# Patient Record
Sex: FEMALE | Race: WHITE
[De-identification: names, ages, dates, MRNs, and addresses within clinical notes are randomized per-mention and may not be internally consistent; named-entity substitution may affect disease eponyms.]

---

## 2017-07-18 ENCOUNTER — APPOINTMENT (OUTPATIENT)
Dept: CARDIOLOGY | Facility: CLINIC | Age: 67
End: 2017-07-18

## 2017-07-31 ENCOUNTER — RX RENEWAL (OUTPATIENT)
Age: 67
End: 2017-07-31

## 2017-08-30 ENCOUNTER — RECORD ABSTRACTING (OUTPATIENT)
Age: 67
End: 2017-08-30

## 2017-08-30 ENCOUNTER — NON-APPOINTMENT (OUTPATIENT)
Age: 67
End: 2017-08-30

## 2017-08-30 ENCOUNTER — APPOINTMENT (OUTPATIENT)
Dept: CARDIOLOGY | Facility: CLINIC | Age: 67
End: 2017-08-30
Payer: MEDICARE

## 2017-08-30 VITALS
WEIGHT: 127 LBS | HEART RATE: 69 BPM | BODY MASS INDEX: 24.94 KG/M2 | OXYGEN SATURATION: 98 % | TEMPERATURE: 98.1 F | HEIGHT: 60 IN

## 2017-08-30 VITALS — RESPIRATION RATE: 14 BRPM | SYSTOLIC BLOOD PRESSURE: 126 MMHG | DIASTOLIC BLOOD PRESSURE: 78 MMHG

## 2017-08-30 PROCEDURE — 93000 ELECTROCARDIOGRAM COMPLETE: CPT

## 2017-08-30 PROCEDURE — 99215 OFFICE O/P EST HI 40 MIN: CPT | Mod: 25

## 2017-10-18 ENCOUNTER — APPOINTMENT (OUTPATIENT)
Dept: CARDIOLOGY | Facility: CLINIC | Age: 67
End: 2017-10-18
Payer: MEDICARE

## 2017-10-18 PROCEDURE — 93306 TTE W/DOPPLER COMPLETE: CPT

## 2018-10-18 ENCOUNTER — MEDICATION RENEWAL (OUTPATIENT)
Age: 68
End: 2018-10-18

## 2018-10-22 ENCOUNTER — MEDICATION RENEWAL (OUTPATIENT)
Age: 68
End: 2018-10-22

## 2018-11-02 ENCOUNTER — NON-APPOINTMENT (OUTPATIENT)
Age: 68
End: 2018-11-02

## 2018-11-02 ENCOUNTER — APPOINTMENT (OUTPATIENT)
Dept: CARDIOLOGY | Facility: CLINIC | Age: 68
End: 2018-11-02
Payer: MEDICARE

## 2018-11-02 VITALS — HEIGHT: 60 IN | BODY MASS INDEX: 24.54 KG/M2 | WEIGHT: 125 LBS

## 2018-11-02 VITALS — SYSTOLIC BLOOD PRESSURE: 150 MMHG | DIASTOLIC BLOOD PRESSURE: 72 MMHG | HEART RATE: 102 BPM | OXYGEN SATURATION: 99 %

## 2018-11-02 VITALS — DIASTOLIC BLOOD PRESSURE: 80 MMHG | SYSTOLIC BLOOD PRESSURE: 114 MMHG

## 2018-11-02 PROCEDURE — 93000 ELECTROCARDIOGRAM COMPLETE: CPT

## 2018-11-02 PROCEDURE — 99214 OFFICE O/P EST MOD 30 MIN: CPT | Mod: 25

## 2018-11-02 RX ORDER — OMALIZUMAB 202.5 MG/1.4ML
150 INJECTION, SOLUTION SUBCUTANEOUS
Refills: 0 | Status: ACTIVE | COMMUNITY

## 2018-12-03 ENCOUNTER — MEDICATION RENEWAL (OUTPATIENT)
Age: 68
End: 2018-12-03

## 2018-12-05 ENCOUNTER — APPOINTMENT (OUTPATIENT)
Dept: CARDIOLOGY | Facility: CLINIC | Age: 68
End: 2018-12-05

## 2018-12-21 ENCOUNTER — APPOINTMENT (OUTPATIENT)
Dept: OPHTHALMOLOGY | Facility: CLINIC | Age: 68
End: 2018-12-21

## 2018-12-26 ENCOUNTER — MEDICATION RENEWAL (OUTPATIENT)
Age: 68
End: 2018-12-26

## 2018-12-26 ENCOUNTER — RX RENEWAL (OUTPATIENT)
Age: 68
End: 2018-12-26

## 2019-01-02 ENCOUNTER — APPOINTMENT (OUTPATIENT)
Dept: OPHTHALMOLOGY | Facility: CLINIC | Age: 69
End: 2019-01-02
Payer: MEDICARE

## 2019-01-02 DIAGNOSIS — H04.122 DRY EYE SYNDROME OF LEFT LACRIMAL GLAND: ICD-10-CM

## 2019-01-02 DIAGNOSIS — H26.9 UNSPECIFIED CATARACT: ICD-10-CM

## 2019-01-02 PROCEDURE — 92002 INTRM OPH EXAM NEW PATIENT: CPT

## 2019-04-03 ENCOUNTER — CLINICAL ADVICE (OUTPATIENT)
Age: 69
End: 2019-04-03

## 2019-04-03 ENCOUNTER — RX RENEWAL (OUTPATIENT)
Age: 69
End: 2019-04-03

## 2019-05-01 ENCOUNTER — RX RENEWAL (OUTPATIENT)
Age: 69
End: 2019-05-01

## 2019-06-10 ENCOUNTER — MEDICATION RENEWAL (OUTPATIENT)
Age: 69
End: 2019-06-10

## 2019-06-18 ENCOUNTER — NON-APPOINTMENT (OUTPATIENT)
Age: 69
End: 2019-06-18

## 2019-06-18 ENCOUNTER — APPOINTMENT (OUTPATIENT)
Dept: CARDIOLOGY | Facility: CLINIC | Age: 69
End: 2019-06-18
Payer: MEDICARE

## 2019-06-18 VITALS
OXYGEN SATURATION: 97 % | HEIGHT: 60 IN | WEIGHT: 125 LBS | HEART RATE: 66 BPM | SYSTOLIC BLOOD PRESSURE: 128 MMHG | DIASTOLIC BLOOD PRESSURE: 75 MMHG | BODY MASS INDEX: 24.54 KG/M2

## 2019-06-18 PROCEDURE — 93000 ELECTROCARDIOGRAM COMPLETE: CPT

## 2019-06-18 PROCEDURE — 99214 OFFICE O/P EST MOD 30 MIN: CPT | Mod: 25

## 2019-06-18 RX ORDER — CETIRIZINE HCL 10 MG
10 TABLET ORAL
Refills: 0 | Status: ACTIVE | COMMUNITY

## 2019-06-18 NOTE — REVIEW OF SYSTEMS
[Joint Pain] : joint pain [Shortness Of Breath] : no shortness of breath [Dyspnea on exertion] : not dyspnea during exertion [Chest  Pressure] : no chest pressure [Chest Pain] : no chest pain [Lower Ext Edema] : no extremity edema [Palpitations] : no palpitations [Wheezing] : no wheezing [Under Stress] : under stress

## 2019-06-18 NOTE — PHYSICAL EXAM
[Normal Appearance] : normal appearance [Well Groomed] : well groomed [General Appearance - In No Acute Distress] : no acute distress [Eyelids - No Xanthelasma] : the eyelids demonstrated no xanthelasmas [No Oral Cyanosis] : no oral cyanosis [] : no respiratory distress [Respiration, Rhythm And Depth] : normal respiratory rhythm and effort [Auscultation Breath Sounds / Voice Sounds] : lungs were clear to auscultation bilaterally [Heart Rate And Rhythm] : heart rate and rhythm were normal [Heart Sounds] : normal S1 and S2 [Murmurs] : no murmurs present [Edema] : no peripheral edema present [Cyanosis, Localized] : no localized cyanosis [Oriented To Time, Place, And Person] : oriented to person, place, and time [Gait - Sufficient For Exercise Testing] : the gait was sufficient for exercise testing

## 2019-06-18 NOTE — HISTORY OF PRESENT ILLNESS
[FreeTextEntry1] : Silvia Curran is a 69 year old woman with a history of asthmatic bronchitis, small pericardial effusion, hypertension, hyperlipidemia, osteoarthritis, anemia, who returns for routine cardiac examination.  She recently contacted me by telephone to describe elevated blood pressure readings (when checked in her allergist's office) -- we subsequently increased her dose of losartan from 25 mg to 50 mg; she has been tolerating therapy and continues to feel well.  She has still not scheduled echocardiography and exercise stress testing (ordered many months ago).  She denies angina, dyspnea, palpitations.

## 2019-06-18 NOTE — DISCUSSION/SUMMARY
[___ Month(s)] : [unfilled] month(s) [With Me] : with me [FreeTextEntry1] : \par Chest pain: Previous complaints of chest pain anya not recurred; continue to await her scheduling of exercise ECG stress testing.\par \par Hypertension: Improved / controlled; continue losartan.\par \par Pericardial effusion: Reassess with echocardiography.\par

## 2019-06-24 ENCOUNTER — MEDICATION RENEWAL (OUTPATIENT)
Age: 69
End: 2019-06-24

## 2019-07-08 ENCOUNTER — APPOINTMENT (OUTPATIENT)
Dept: CARDIOLOGY | Facility: CLINIC | Age: 69
End: 2019-07-08
Payer: MEDICARE

## 2019-07-08 PROCEDURE — 93015 CV STRESS TEST SUPVJ I&R: CPT

## 2019-07-10 ENCOUNTER — APPOINTMENT (OUTPATIENT)
Dept: CARDIOLOGY | Facility: CLINIC | Age: 69
End: 2019-07-10

## 2019-10-29 ENCOUNTER — OUTPATIENT (OUTPATIENT)
Dept: OUTPATIENT SERVICES | Facility: HOSPITAL | Age: 69
LOS: 1 days | End: 2019-10-29
Payer: MEDICARE

## 2019-10-29 DIAGNOSIS — M54.12 RADICULOPATHY, CERVICAL REGION: ICD-10-CM

## 2019-10-29 PROCEDURE — 62321 NJX INTERLAMINAR CRV/THRC: CPT

## 2019-10-29 PROCEDURE — 77003 FLUOROGUIDE FOR SPINE INJECT: CPT

## 2019-11-14 ENCOUNTER — RESULT REVIEW (OUTPATIENT)
Age: 69
End: 2019-11-14

## 2019-12-06 ENCOUNTER — APPOINTMENT (OUTPATIENT)
Dept: CARDIOLOGY | Facility: CLINIC | Age: 69
End: 2019-12-06
Payer: MEDICARE

## 2019-12-06 ENCOUNTER — NON-APPOINTMENT (OUTPATIENT)
Age: 69
End: 2019-12-06

## 2019-12-06 VITALS
HEART RATE: 99 BPM | OXYGEN SATURATION: 96 % | HEIGHT: 60 IN | SYSTOLIC BLOOD PRESSURE: 105 MMHG | DIASTOLIC BLOOD PRESSURE: 73 MMHG | BODY MASS INDEX: 23.95 KG/M2 | WEIGHT: 122 LBS

## 2019-12-06 VITALS — SYSTOLIC BLOOD PRESSURE: 108 MMHG | DIASTOLIC BLOOD PRESSURE: 76 MMHG

## 2019-12-06 DIAGNOSIS — E78.5 HYPERLIPIDEMIA, UNSPECIFIED: ICD-10-CM

## 2019-12-06 PROCEDURE — 99214 OFFICE O/P EST MOD 30 MIN: CPT

## 2019-12-06 PROCEDURE — 93000 ELECTROCARDIOGRAM COMPLETE: CPT

## 2019-12-06 NOTE — DISCUSSION/SUMMARY
[___ Month(s)] : [unfilled] month(s) [With Me] : with me [FreeTextEntry1] : \par Coronary atherosclerosis: Asymptomatic; normal recent exercise ECG stress test.  We discussed risk factor modification.\par \par Hypertension: Blood pressure lower than previously measured, possibly related to recent weight loss; I recommend decreasing the dose of losartan to 25 mg daily.\par \par Hyperlipidemia: Patient will have a fasting lipid panel checked by her primary care physician and then contact me to discuss results -- would favor initiation of a statin if LDL remains elevated (for example, in January 2018 her LDL was 147).\par \par Pericardial effusion: There is a long history of small pericardial effusion - she'll return for echocardiography to reassess size.

## 2019-12-06 NOTE — PHYSICAL EXAM
[Normal Appearance] : normal appearance [Well Groomed] : well groomed [General Appearance - In No Acute Distress] : no acute distress [] : no respiratory distress [Respiration, Rhythm And Depth] : normal respiratory rhythm and effort [Heart Rate And Rhythm] : heart rate and rhythm were normal [Auscultation Breath Sounds / Voice Sounds] : lungs were clear to auscultation bilaterally [Murmurs] : no murmurs present [Heart Sounds] : normal S1 and S2 [Oriented To Time, Place, And Person] : oriented to person, place, and time [Abnormal Walk] : normal gait [Impaired Insight] : insight and judgment were intact [Mood] : the mood was normal [Affect] : the affect was normal

## 2019-12-06 NOTE — HISTORY OF PRESENT ILLNESS
[FreeTextEntry1] : Silvia Curran is a 69 year old woman with a history of asthmatic bronchitis, small pericardial effusion, hypertension, hyperlipidemia, osteoarthritis, anemia, who returns for routine cardiac examination.  She was recently diagnosed with a head and neck cancer and PET scan describes the presence of coronary and aortic atherosclerosis and small pericardial effusion.   She continues to feel well from a cardiac standpoint describes "lower than normal" blood pressure on recent measurements made during other doctors visits.  She has not been experiencing lightheadedness, dizziness, syncope, angina, or dyspnea.

## 2019-12-06 NOTE — REVIEW OF SYSTEMS
[Under Stress] : under stress [Joint Pain] : joint pain [Recent Weight Loss (___ Lbs)] : recent [unfilled] ~Ulb weight loss [see HPI] : see HPI [Shortness Of Breath] : no shortness of breath [Chest  Pressure] : no chest pressure [Dyspnea on exertion] : not dyspnea during exertion [Palpitations] : no palpitations [Lower Ext Edema] : no extremity edema [Chest Pain] : no chest pain [Wheezing] : no wheezing

## 2020-01-03 ENCOUNTER — APPOINTMENT (OUTPATIENT)
Dept: SPEECH THERAPY | Facility: CLINIC | Age: 70
End: 2020-01-03
Payer: MEDICARE

## 2020-01-03 PROCEDURE — 92610 EVALUATE SWALLOWING FUNCTION: CPT | Mod: GN

## 2020-01-06 ENCOUNTER — MEDICATION RENEWAL (OUTPATIENT)
Age: 70
End: 2020-01-06

## 2020-01-06 ENCOUNTER — RX RENEWAL (OUTPATIENT)
Age: 70
End: 2020-01-06

## 2020-01-29 ENCOUNTER — APPOINTMENT (OUTPATIENT)
Dept: SPEECH THERAPY | Facility: CLINIC | Age: 70
End: 2020-01-29

## 2020-02-26 ENCOUNTER — APPOINTMENT (OUTPATIENT)
Dept: SPEECH THERAPY | Facility: CLINIC | Age: 70
End: 2020-02-26
Payer: MEDICARE

## 2020-02-26 PROCEDURE — 92526 ORAL FUNCTION THERAPY: CPT | Mod: GN

## 2020-03-16 ENCOUNTER — APPOINTMENT (OUTPATIENT)
Dept: SPEECH THERAPY | Facility: CLINIC | Age: 70
End: 2020-03-16

## 2020-04-27 ENCOUNTER — APPOINTMENT (OUTPATIENT)
Dept: SPEECH THERAPY | Facility: CLINIC | Age: 70
End: 2020-04-27
Payer: MEDICARE

## 2020-04-27 PROCEDURE — 99448 NTRPROF PH1/NTRNET/EHR 21-30: CPT | Mod: GN

## 2020-05-21 ENCOUNTER — APPOINTMENT (OUTPATIENT)
Dept: SPEECH THERAPY | Facility: CLINIC | Age: 70
End: 2020-05-21
Payer: MEDICARE

## 2020-05-21 PROCEDURE — 98967 PH1 ASSMT&MGMT NQHP 11-20: CPT | Mod: CR

## 2020-08-27 ENCOUNTER — APPOINTMENT (OUTPATIENT)
Dept: ORTHOPEDIC SURGERY | Facility: CLINIC | Age: 70
End: 2020-08-27
Payer: MEDICARE

## 2020-08-27 VITALS
BODY MASS INDEX: 20.62 KG/M2 | WEIGHT: 105 LBS | HEIGHT: 60 IN | DIASTOLIC BLOOD PRESSURE: 83 MMHG | SYSTOLIC BLOOD PRESSURE: 144 MMHG | HEART RATE: 92 BPM

## 2020-08-27 PROCEDURE — 99203 OFFICE O/P NEW LOW 30 MIN: CPT

## 2020-08-29 RX ORDER — ALENDRONATE SODIUM 35 MG/1
35 TABLET ORAL
Qty: 12 | Refills: 0 | Status: ACTIVE | COMMUNITY
Start: 2020-04-23

## 2020-08-29 RX ORDER — ALBUTEROL SULFATE 2.5 MG/3ML
(2.5 MG/3ML) SOLUTION RESPIRATORY (INHALATION)
Qty: 375 | Refills: 0 | Status: ACTIVE | COMMUNITY
Start: 2020-02-24

## 2020-11-12 ENCOUNTER — APPOINTMENT (OUTPATIENT)
Dept: ORTHOPEDIC SURGERY | Facility: CLINIC | Age: 70
End: 2020-11-12
Payer: MEDICARE

## 2020-11-12 VITALS
BODY MASS INDEX: 20.62 KG/M2 | HEIGHT: 60 IN | HEART RATE: 102 BPM | DIASTOLIC BLOOD PRESSURE: 77 MMHG | SYSTOLIC BLOOD PRESSURE: 120 MMHG | WEIGHT: 105 LBS

## 2020-11-12 DIAGNOSIS — M19.041 PRIMARY OSTEOARTHRITIS, RIGHT HAND: ICD-10-CM

## 2020-11-12 DIAGNOSIS — M19.042 PRIMARY OSTEOARTHRITIS, LEFT HAND: ICD-10-CM

## 2020-11-12 PROCEDURE — 99214 OFFICE O/P EST MOD 30 MIN: CPT

## 2020-11-18 ENCOUNTER — OUTPATIENT (OUTPATIENT)
Dept: OUTPATIENT SERVICES | Facility: HOSPITAL | Age: 70
LOS: 1 days | End: 2020-11-18
Payer: MEDICARE

## 2020-11-18 VITALS
SYSTOLIC BLOOD PRESSURE: 123 MMHG | DIASTOLIC BLOOD PRESSURE: 80 MMHG | RESPIRATION RATE: 16 BRPM | TEMPERATURE: 98 F | HEART RATE: 86 BPM | HEIGHT: 60 IN | OXYGEN SATURATION: 98 % | WEIGHT: 104.94 LBS

## 2020-11-18 DIAGNOSIS — Z98.890 OTHER SPECIFIED POSTPROCEDURAL STATES: Chronic | ICD-10-CM

## 2020-11-18 DIAGNOSIS — Z98.49 CATARACT EXTRACTION STATUS, UNSPECIFIED EYE: Chronic | ICD-10-CM

## 2020-11-18 DIAGNOSIS — J44.9 CHRONIC OBSTRUCTIVE PULMONARY DISEASE, UNSPECIFIED: ICD-10-CM

## 2020-11-18 DIAGNOSIS — Z87.81 PERSONAL HISTORY OF (HEALED) TRAUMATIC FRACTURE: Chronic | ICD-10-CM

## 2020-11-18 DIAGNOSIS — G56.00 CARPAL TUNNEL SYNDROME, UNSPECIFIED UPPER LIMB: ICD-10-CM

## 2020-11-18 DIAGNOSIS — Z01.818 ENCOUNTER FOR OTHER PREPROCEDURAL EXAMINATION: ICD-10-CM

## 2020-11-18 DIAGNOSIS — G56.02 CARPAL TUNNEL SYNDROME, LEFT UPPER LIMB: ICD-10-CM

## 2020-11-18 LAB
ALBUMIN SERPL ELPH-MCNC: 4.3 G/DL — SIGNIFICANT CHANGE UP (ref 3.3–5)
ALP SERPL-CCNC: 66 U/L — SIGNIFICANT CHANGE UP (ref 40–120)
ALT FLD-CCNC: 15 U/L — SIGNIFICANT CHANGE UP (ref 10–45)
ANION GAP SERPL CALC-SCNC: 11 MMOL/L — SIGNIFICANT CHANGE UP (ref 5–17)
AST SERPL-CCNC: 25 U/L — SIGNIFICANT CHANGE UP (ref 10–40)
BILIRUB SERPL-MCNC: 0.4 MG/DL — SIGNIFICANT CHANGE UP (ref 0.2–1.2)
BUN SERPL-MCNC: 15 MG/DL — SIGNIFICANT CHANGE UP (ref 7–23)
CALCIUM SERPL-MCNC: 10.2 MG/DL — SIGNIFICANT CHANGE UP (ref 8.4–10.5)
CHLORIDE SERPL-SCNC: 102 MMOL/L — SIGNIFICANT CHANGE UP (ref 96–108)
CO2 SERPL-SCNC: 27 MMOL/L — SIGNIFICANT CHANGE UP (ref 22–31)
CREAT SERPL-MCNC: 0.98 MG/DL — SIGNIFICANT CHANGE UP (ref 0.5–1.3)
GLUCOSE SERPL-MCNC: 99 MG/DL — SIGNIFICANT CHANGE UP (ref 70–99)
HCT VFR BLD CALC: 40.1 % — SIGNIFICANT CHANGE UP (ref 34.5–45)
HGB BLD-MCNC: 13.4 G/DL — SIGNIFICANT CHANGE UP (ref 11.5–15.5)
MCHC RBC-ENTMCNC: 28.9 PG — SIGNIFICANT CHANGE UP (ref 27–34)
MCHC RBC-ENTMCNC: 33.4 GM/DL — SIGNIFICANT CHANGE UP (ref 32–36)
MCV RBC AUTO: 86.6 FL — SIGNIFICANT CHANGE UP (ref 80–100)
NRBC # BLD: 0 /100 WBCS — SIGNIFICANT CHANGE UP (ref 0–0)
PLATELET # BLD AUTO: 203 K/UL — SIGNIFICANT CHANGE UP (ref 150–400)
POTASSIUM SERPL-MCNC: 4.2 MMOL/L — SIGNIFICANT CHANGE UP (ref 3.5–5.3)
POTASSIUM SERPL-SCNC: 4.2 MMOL/L — SIGNIFICANT CHANGE UP (ref 3.5–5.3)
PROT SERPL-MCNC: 6.6 G/DL — SIGNIFICANT CHANGE UP (ref 6–8.3)
RBC # BLD: 4.63 M/UL — SIGNIFICANT CHANGE UP (ref 3.8–5.2)
RBC # FLD: 12.4 % — SIGNIFICANT CHANGE UP (ref 10.3–14.5)
SARS-COV-2 RNA SPEC QL NAA+PROBE: SIGNIFICANT CHANGE UP
SODIUM SERPL-SCNC: 140 MMOL/L — SIGNIFICANT CHANGE UP (ref 135–145)
WBC # BLD: 7.28 K/UL — SIGNIFICANT CHANGE UP (ref 3.8–10.5)
WBC # FLD AUTO: 7.28 K/UL — SIGNIFICANT CHANGE UP (ref 3.8–10.5)

## 2020-11-18 PROCEDURE — G0463: CPT

## 2020-11-18 PROCEDURE — U0003: CPT

## 2020-11-18 PROCEDURE — 85027 COMPLETE CBC AUTOMATED: CPT

## 2020-11-18 PROCEDURE — 80053 COMPREHEN METABOLIC PANEL: CPT

## 2020-11-18 RX ORDER — CHLORHEXIDINE GLUCONATE 213 G/1000ML
1 SOLUTION TOPICAL ONCE
Refills: 0 | Status: DISCONTINUED | OUTPATIENT
Start: 2020-11-23 | End: 2020-12-08

## 2020-11-18 RX ORDER — SODIUM CHLORIDE 9 MG/ML
3 INJECTION INTRAMUSCULAR; INTRAVENOUS; SUBCUTANEOUS EVERY 8 HOURS
Refills: 0 | Status: DISCONTINUED | OUTPATIENT
Start: 2020-11-23 | End: 2020-12-08

## 2020-11-18 RX ORDER — LIDOCAINE HCL 20 MG/ML
0.2 VIAL (ML) INJECTION ONCE
Refills: 0 | Status: DISCONTINUED | OUTPATIENT
Start: 2020-11-23 | End: 2020-12-08

## 2020-11-18 NOTE — H&P PST ADULT - HISTORY OF PRESENT ILLNESS
71 yo female with h/o HTN, asthmatic bronchitis, anemia, small pericardial effusion (longstanding), head and neck malignancy (diagnosed 1/2020, s/p 35 radiation treatments and 3 cycles of chemotherapy) and left carpal tunnel syndrome with c/o worsening paresthesias and numbness in the radial aspect of the left hand. Pt is scheduled for Left Wrist Carpal Tunnel Release on 11/23/2020.    Covid-19 PCTR sent from [facility] on 11/18/2020. 71 yo female with h/o HTN, asthmatic bronchitis, anemia, small pericardial effusion (longstanding), head and neck malignancy (diagnosed 1/2020, s/p 35 radiation treatments and 3 cycles of chemotherapy) and left carpal tunnel syndrome with c/o worsening paresthesias and numbness in the radial aspect of the left hand. Pt is scheduled for Left Wrist Carpal Tunnel Release on 11/23/2020.    Covid-19 PCR sent from [facility] on 11/18/2020.

## 2020-11-18 NOTE — H&P PST ADULT - NSICDXPASTMEDICALHX_GEN_ALL_CORE_FT
PAST MEDICAL HISTORY:  Hypertension     Osteoarthritis     Samter's triad      PAST MEDICAL HISTORY:  Asthmatic bronchitis well-controlled, no recent exacerbations    Carpal tunnel syndrome left    Cervicalgia     GERD (gastroesophageal reflux disease)     Head and neck cancer tonsil - diagnosed 1/2020 - s/p 35 radiation treatments and 3 cycles of chemotherapy, followed by Dr Tovar ([facility])    Hypertension     Osteoarthritis     Pericardial effusion longstanding h/o small pericardial effusion - last Echo 2017 revealed trace pericardial effusion    Samter's triad      PAST MEDICAL HISTORY:  Asthmatic bronchitis well-controlled, no recent exacerbations    Carpal tunnel syndrome left    Cervicalgia     COVID-19 Pt had Covid-19 3/2020, did not require hospitalization    GERD (gastroesophageal reflux disease)     Head and neck cancer tonsil - diagnosed 1/2020 - s/p 35 radiation treatments and 3 cycles of chemotherapy, followed by Dr Tovar ([facility])    Hypertension     Osteoarthritis     Pericardial effusion longstanding h/o small pericardial effusion - last Echo 2017 revealed trace pericardial effusion    Samter's triad

## 2020-11-18 NOTE — H&P PST ADULT - NSANTHOSAYNRD_GEN_A_CORE
No. ARSENIO screening performed.  STOP BANG Legend: 0-2 = LOW Risk; 3-4 = INTERMEDIATE Risk; 5-8 = HIGH Risk

## 2020-11-18 NOTE — H&P PST ADULT - NSICDXPROBLEM_GEN_ALL_CORE_FT
PROBLEM DIAGNOSES  Problem: Carpal tunnel syndrome  Assessment and Plan: Left Wrist Carpal Tunnel Release    Problem: Asthmatic bronchitis , chronic  Assessment and Plan: Pt instructed to take Symbicort, Spiriva and Singulair on am of surgery

## 2020-11-18 NOTE — H&P PST ADULT - ATTENDING COMMENTS
The patient has constant paresthesias in the left median nerve distribution. Patient has frequent exacerbation that can be painful at times. Electrodiagnostic studies confirm severe left carpal tunnel syndrome with nonreactive sensory conduction and prolonged motor conduction with decreased amplitude. Because of severity left carpal tunnel release is indicated. However, also because of severity the prognosis is limited, and there is a possibility of minimal improvement. Because the patient has marked symptoms and because surgery would provide the patient for the best chance for the the most amount of improvement, compared to other treatments, the patient wishes to proceed with left carpal tunnel release.  The patient has severe arthritis of small joints especially right hand. However the patient has no notable pain. Because of the spontaneous fusion and no pain, no treatment is recommended.    Surgery for left carpal tunnel syndrome is indicated because of symptoms refractory to conservative treatment, interfering with sleep, and activities of daily living. Because of the duration and severity of carpal tunnel syndrome, ongoing symptoms can be expected postoperatively. While the patient may see improvement, there is no assurance of this. The possibility of little improvement or of no improvement exists. Even though it is low, the possibility of worsening exists as well. Risk of injury to the median nerve, CRPS, persistent paresthesias, wound related pain, weakness, and many other factors, reviewed and discussed.  A lengthy and detailed discussion has been held with the patient. The surgical plan, alternative treatments, and the associated risks, complications, limitations, and expectations have been discussed with the patient. Postoperative plan, need for exercising to regain motion and function, wound care, dressing care, activities, follow up, and possible need for hand therapy have been reviewed and discussed. In addition, the expectation of postop wound related pain, wound induration, swelling, weakness of , alteration of hand and finger use and function, and palmar and forearm tenderness were reviewed. In particular, the expectation of weakness, difficulty with daily activities, and wound induration often lasting six months have been stressed.   All questions have been answered. The patient has expressed understanding and acceptance of the above, and has consented to surgery.

## 2020-11-18 NOTE — H&P PST ADULT - NSICDXPASTSURGICALHX_GEN_ALL_CORE_FT
PAST SURGICAL HISTORY:  H/O hand surgery right hand - joints fused    H/O sinus surgery 2018    History of bunionectomy bilateral    History of tibial fracture 1996 right - s/p ORIF    S/P arthroscopy of knee left     PAST SURGICAL HISTORY:  H/O cataract extraction 2/2019 bilateral    H/O hand surgery right hand - joints fused    H/O sinus surgery 2018    History of bunionectomy bilateral    History of tibial fracture 1996 right - s/p ORIF    S/P arthroscopy of knee left

## 2020-11-21 ENCOUNTER — APPOINTMENT (OUTPATIENT)
Dept: CARDIOLOGY | Facility: CLINIC | Age: 70
End: 2020-11-21

## 2020-11-22 ENCOUNTER — TRANSCRIPTION ENCOUNTER (OUTPATIENT)
Age: 70
End: 2020-11-22

## 2020-11-22 RX ORDER — SODIUM CHLORIDE 9 MG/ML
1000 INJECTION, SOLUTION INTRAVENOUS
Refills: 0 | Status: DISCONTINUED | OUTPATIENT
Start: 2020-11-23 | End: 2020-12-08

## 2020-11-23 ENCOUNTER — APPOINTMENT (OUTPATIENT)
Dept: ORTHOPEDIC SURGERY | Facility: HOSPITAL | Age: 70
End: 2020-11-23

## 2020-11-23 ENCOUNTER — OUTPATIENT (OUTPATIENT)
Dept: OUTPATIENT SERVICES | Facility: HOSPITAL | Age: 70
LOS: 1 days | End: 2020-11-23
Payer: MEDICARE

## 2020-11-23 VITALS
HEART RATE: 101 BPM | WEIGHT: 104.94 LBS | DIASTOLIC BLOOD PRESSURE: 70 MMHG | HEIGHT: 60 IN | TEMPERATURE: 97 F | SYSTOLIC BLOOD PRESSURE: 120 MMHG | RESPIRATION RATE: 16 BRPM | OXYGEN SATURATION: 96 %

## 2020-11-23 VITALS
RESPIRATION RATE: 16 BRPM | DIASTOLIC BLOOD PRESSURE: 76 MMHG | OXYGEN SATURATION: 99 % | HEART RATE: 82 BPM | SYSTOLIC BLOOD PRESSURE: 142 MMHG | TEMPERATURE: 98 F

## 2020-11-23 DIAGNOSIS — Z98.49 CATARACT EXTRACTION STATUS, UNSPECIFIED EYE: Chronic | ICD-10-CM

## 2020-11-23 DIAGNOSIS — Z98.890 OTHER SPECIFIED POSTPROCEDURAL STATES: Chronic | ICD-10-CM

## 2020-11-23 DIAGNOSIS — G56.02 CARPAL TUNNEL SYNDROME, LEFT UPPER LIMB: ICD-10-CM

## 2020-11-23 DIAGNOSIS — Z87.81 PERSONAL HISTORY OF (HEALED) TRAUMATIC FRACTURE: Chronic | ICD-10-CM

## 2020-11-23 PROCEDURE — 64721 CARPAL TUNNEL SURGERY: CPT | Mod: LT

## 2020-11-23 RX ORDER — TIOTROPIUM BROMIDE 18 UG/1
1 CAPSULE ORAL; RESPIRATORY (INHALATION)
Qty: 0 | Refills: 0 | DISCHARGE

## 2020-11-23 RX ORDER — ALBUTEROL 90 UG/1
3 AEROSOL, METERED ORAL
Qty: 0 | Refills: 0 | DISCHARGE

## 2020-11-23 RX ORDER — OMEPRAZOLE 10 MG/1
1 CAPSULE, DELAYED RELEASE ORAL
Qty: 0 | Refills: 0 | DISCHARGE

## 2020-11-23 RX ORDER — ALENDRONATE SODIUM 70 MG/1
1 TABLET ORAL
Qty: 0 | Refills: 0 | DISCHARGE

## 2020-11-23 RX ORDER — BUDESONIDE AND FORMOTEROL FUMARATE DIHYDRATE 160; 4.5 UG/1; UG/1
2 AEROSOL RESPIRATORY (INHALATION)
Qty: 0 | Refills: 0 | DISCHARGE

## 2020-11-23 NOTE — ASU PATIENT PROFILE, ADULT - PMH
Asthmatic bronchitis  well-controlled, no recent exacerbations  Carpal tunnel syndrome  left  Cervicalgia    COVID-19  Pt had Covid-19 3/2020, did not require hospitalization  GERD (gastroesophageal reflux disease)    Head and neck cancer  tonsil - diagnosed 1/2020 - s/p 35 radiation treatments and 3 cycles of chemotherapy, followed by Dr Tovar ([facility])  Hypertension    Osteoarthritis    Pericardial effusion  longstanding h/o small pericardial effusion - last Echo 2017 revealed trace pericardial effusion  Samter's triad

## 2020-11-23 NOTE — ASU PATIENT PROFILE, ADULT - PSH
H/O cataract extraction  2/2019 bilateral  H/O hand surgery  right hand - joints fused  H/O sinus surgery  2018  History of bunionectomy  bilateral  History of tibial fracture  1996 right - s/p ORIF  S/P arthroscopy of knee  left

## 2020-11-23 NOTE — ASU DISCHARGE PLAN (ADULT/PEDIATRIC) - CALL YOUR DOCTOR IF YOU HAVE ANY OF THE FOLLOWING:
Pain not relieved by Medications/Swelling that gets worse/Wound/Surgical Site with redness, or foul smelling discharge or pus/Nausea and vomiting that does not stop/Numbness, tingling, color or temperature change to extremity/Increased irritability or sluggishness/Fever greater than (need to indicate Fahrenheit or Celsius)

## 2020-11-23 NOTE — ASU PATIENT PROFILE, ADULT - AS [facility] [employer]
need for outpatient follow-up/return to ED if symptoms worsen, persist or questions arise/**ATTENDING ADDENDUM (Dr. Jason Self): Anticipatory guidance provided. (4) no limitation

## 2020-11-23 NOTE — ASU DISCHARGE PLAN (ADULT/PEDIATRIC) - CARE PROVIDER_API CALL
Jaydon Dia  ORTHOPAEDIC SURGERY  [address], [address]  [place] [address]  Phone: [phone]  Fax: [phone]  Follow Up Time:

## 2020-11-23 NOTE — ASU DISCHARGE PLAN (ADULT/PEDIATRIC) - NURSING INSTRUCTIONS
******************************************************************************************  Next dose of TYLENOL may be taken at or after ____9:30____ PM if needed. DO NOT take any additional products containing TYLENOL or ACETAMINOPHEN, such as VICODIN, PERCOCET, NORCO, EXCEDRIN, and any over-the-counter cold medications until this time. DO NOT CONSUME MORE THAN 9451-4860 MG OF TYLENOL (acetaminophen) in a 24-hour period.

## 2020-12-02 ENCOUNTER — APPOINTMENT (OUTPATIENT)
Dept: ORTHOPEDIC SURGERY | Facility: CLINIC | Age: 70
End: 2020-12-02
Payer: MEDICARE

## 2020-12-02 VITALS — TEMPERATURE: 96.7 F

## 2020-12-02 PROCEDURE — 99024 POSTOP FOLLOW-UP VISIT: CPT

## 2020-12-14 ENCOUNTER — NON-APPOINTMENT (OUTPATIENT)
Age: 70
End: 2020-12-14

## 2020-12-14 PROBLEM — G56.00 CARPAL TUNNEL SYNDROME, UNSPECIFIED UPPER LIMB: Chronic | Status: ACTIVE | Noted: 2020-11-18

## 2020-12-14 PROBLEM — J45.909 UNSPECIFIED ASTHMA, UNCOMPLICATED: Chronic | Status: ACTIVE | Noted: 2020-11-18

## 2020-12-14 PROBLEM — I31.3 PERICARDIAL EFFUSION (NONINFLAMMATORY): Chronic | Status: ACTIVE | Noted: 2020-11-18

## 2020-12-14 PROBLEM — C76.0 MALIGNANT NEOPLASM OF HEAD, FACE AND NECK: Chronic | Status: ACTIVE | Noted: 2020-11-18

## 2020-12-14 PROBLEM — K21.9 GASTRO-ESOPHAGEAL REFLUX DISEASE WITHOUT ESOPHAGITIS: Chronic | Status: ACTIVE | Noted: 2020-11-18

## 2020-12-14 PROBLEM — M54.2 CERVICALGIA: Chronic | Status: ACTIVE | Noted: 2020-11-18

## 2020-12-14 PROBLEM — U07.1 COVID-19: Chronic | Status: ACTIVE | Noted: 2020-11-18

## 2020-12-14 PROBLEM — I10 ESSENTIAL (PRIMARY) HYPERTENSION: Chronic | Status: ACTIVE | Noted: 2020-11-18

## 2020-12-14 PROBLEM — M19.90 UNSPECIFIED OSTEOARTHRITIS, UNSPECIFIED SITE: Chronic | Status: ACTIVE | Noted: 2020-11-18

## 2020-12-16 ENCOUNTER — APPOINTMENT (OUTPATIENT)
Dept: ORTHOPEDIC SURGERY | Facility: CLINIC | Age: 70
End: 2020-12-16
Payer: MEDICARE

## 2020-12-16 VITALS — TEMPERATURE: 98.3 F

## 2020-12-16 VITALS — TEMPERATURE: 97.1 F

## 2020-12-16 DIAGNOSIS — G56.02 CARPAL TUNNEL SYNDROME, LEFT UPPER LIMB: ICD-10-CM

## 2020-12-16 PROCEDURE — 99024 POSTOP FOLLOW-UP VISIT: CPT

## 2020-12-28 ENCOUNTER — APPOINTMENT (OUTPATIENT)
Dept: CARDIOLOGY | Facility: CLINIC | Age: 70
End: 2020-12-28
Payer: MEDICARE

## 2020-12-28 PROCEDURE — 93306 TTE W/DOPPLER COMPLETE: CPT

## 2021-01-05 ENCOUNTER — APPOINTMENT (OUTPATIENT)
Dept: CARDIOLOGY | Facility: CLINIC | Age: 71
End: 2021-01-05
Payer: MEDICARE

## 2021-01-05 ENCOUNTER — NON-APPOINTMENT (OUTPATIENT)
Age: 71
End: 2021-01-05

## 2021-01-05 VITALS
HEART RATE: 90 BPM | DIASTOLIC BLOOD PRESSURE: 84 MMHG | TEMPERATURE: 98.7 F | BODY MASS INDEX: 22.19 KG/M2 | HEIGHT: 60 IN | OXYGEN SATURATION: 100 % | SYSTOLIC BLOOD PRESSURE: 134 MMHG | WEIGHT: 113 LBS

## 2021-01-05 DIAGNOSIS — I25.10 ATHEROSCLEROTIC HEART DISEASE OF NATIVE CORONARY ARTERY W/OUT ANGINA PECTORIS: ICD-10-CM

## 2021-01-05 PROCEDURE — 93000 ELECTROCARDIOGRAM COMPLETE: CPT

## 2021-01-05 PROCEDURE — 99214 OFFICE O/P EST MOD 30 MIN: CPT

## 2021-01-05 RX ORDER — OMEPRAZOLE 40 MG/1
40 CAPSULE, DELAYED RELEASE ORAL DAILY
Refills: 0 | Status: ACTIVE | COMMUNITY

## 2021-01-05 RX ORDER — ALENDRONATE SODIUM 70 MG/1
70 TABLET ORAL
Refills: 0 | Status: DISCONTINUED | COMMUNITY
End: 2021-01-05

## 2021-01-05 RX ORDER — MAGNESIUM OXIDE 84.5(140)
140 CAPSULE ORAL
Qty: 90 | Refills: 0 | Status: DISCONTINUED | COMMUNITY
Start: 2020-05-29 | End: 2021-01-05

## 2021-01-05 RX ORDER — GABAPENTIN 300 MG/1
300 CAPSULE ORAL
Qty: 90 | Refills: 0 | Status: DISCONTINUED | COMMUNITY
Start: 2020-03-12 | End: 2021-01-05

## 2021-01-05 RX ORDER — DIAZEPAM 5 MG/1
5 TABLET ORAL
Qty: 90 | Refills: 0 | Status: DISCONTINUED | COMMUNITY
Start: 2020-04-13 | End: 2021-01-05

## 2021-01-05 RX ORDER — OMEPRAZOLE 20 MG/1
20 CAPSULE, DELAYED RELEASE ORAL
Qty: 90 | Refills: 0 | Status: DISCONTINUED | COMMUNITY
Start: 2020-07-30 | End: 2021-01-05

## 2021-01-05 RX ORDER — MAGNESIUM OXIDE 241.3 MG/1000MG
400 TABLET ORAL
Qty: 90 | Refills: 0 | Status: DISCONTINUED | COMMUNITY
Start: 2020-03-04 | End: 2021-01-05

## 2021-01-05 RX ORDER — NYSTATIN 100000 [USP'U]/ML
100000 SUSPENSION ORAL
Qty: 280 | Refills: 0 | Status: DISCONTINUED | COMMUNITY
Start: 2020-03-18 | End: 2021-01-05

## 2021-01-05 RX ORDER — AMOXICILLIN AND CLAVULANATE POTASSIUM 875; 125 MG/1; MG/1
875-125 TABLET, COATED ORAL
Qty: 20 | Refills: 0 | Status: DISCONTINUED | COMMUNITY
Start: 2020-08-20 | End: 2021-01-05

## 2021-01-05 RX ORDER — BUDESONIDE AND FORMOTEROL FUMARATE DIHYDRATE 80; 4.5 UG/1; UG/1
80-4.5 AEROSOL RESPIRATORY (INHALATION) TWICE DAILY
Refills: 2 | Status: DISCONTINUED | COMMUNITY
End: 2021-01-05

## 2021-01-05 RX ORDER — LEVOFLOXACIN 750 MG/1
750 TABLET, FILM COATED ORAL
Qty: 7 | Refills: 0 | Status: DISCONTINUED | COMMUNITY
Start: 2020-03-18 | End: 2021-01-05

## 2021-01-05 RX ORDER — AZITHROMYCIN 250 MG/1
250 TABLET, FILM COATED ORAL
Qty: 6 | Refills: 0 | Status: DISCONTINUED | COMMUNITY
Start: 2020-04-10 | End: 2021-01-05

## 2021-01-05 NOTE — PHYSICAL EXAM
[Well Groomed] : well groomed [General Appearance - In No Acute Distress] : no acute distress [Respiration, Rhythm And Depth] : normal respiratory rhythm and effort [Auscultation Breath Sounds / Voice Sounds] : lungs were clear to auscultation bilaterally [Heart Rate And Rhythm] : heart rate and rhythm were normal [Heart Sounds] : normal S1 and S2 [Murmurs] : no murmurs present [Abnormal Walk] : normal gait [Impaired Insight] : insight and judgment were intact [Affect] : the affect was normal [Mood] : the mood was normal [Edema] : no peripheral edema present [] : no rash [FreeTextEntry1] : No JVD

## 2021-01-05 NOTE — REVIEW OF SYSTEMS
[Recent Weight Loss (___ Lbs)] : recent [unfilled] ~Ulb weight loss [see HPI] : see HPI [Joint Pain] : joint pain [Under Stress] : under stress [Abdominal Pain] : abdominal pain [Shortness Of Breath] : no shortness of breath [Dyspnea on exertion] : not dyspnea during exertion [Chest  Pressure] : no chest pressure [Chest Pain] : no chest pain [Lower Ext Edema] : no extremity edema [Palpitations] : no palpitations [Wheezing] : no wheezing [FreeTextEntry2] : Neck pain / limited ROM

## 2021-01-05 NOTE — HISTORY OF PRESENT ILLNESS
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  She describes COVID-19 infection (~ March 2020) -- recovered at home.  She has no new cardiovascular symptoms and describes a good functional status - plays pickelball and golf.

## 2021-01-05 NOTE — DISCUSSION/SUMMARY
[___ Month(s)] : [unfilled] month(s) [With Me] : with me [FreeTextEntry1] : \par Pericardial effusion: Long history of pericardial effusion-- echocardiography greater than 5 years ago revealed a small effusion; no significant increase in size on serial imaging -- also evaluated during recent PET/CT and described a small.  He discussed management and I recommended continued observation - small size would make pericardiocentesis difficult; annual echocardiography seems appropriate.\par \par Coronary atherosclerosis: Asymptomatic; no angina in the setting of an excellent baseline exercise capacity. \par \par Hypertension: Controlled;  continue losartan.\par

## 2022-08-10 NOTE — ASU DISCHARGE PLAN (ADULT/PEDIATRIC) - PROCEDURE
History   Chief Complaint:  Dizziness, Chest Pain, and Nausea     The history is provided by the patient.      Cynthia Arita is a 77 year old female with history of hypertension who presents with chest pain and vertigo like dizziness. Her chest is tender to palpation, and the pain has been present for about 1.5 weeks. Her pain is not exacerbated by taking deep breath as she denies dyspnea. She has had shingles in the past and is confident this pain is different; she has never had these symptoms before. Her pain is accompanied with palpitations and nausea. She also is concerned about her chronic dry mouth which she attributes to meclizine use for her vertigo. Her dry mouth gives her head a tight feeling. She has seen an ENT for this and tried various OTC medications. She denies rash, abdominal pain or shortness of breath. She denies any injury or fall.       Review of Systems   Respiratory: Negative for shortness of breath.    Cardiovascular: Positive for chest pain and palpitations.   Gastrointestinal: Positive for nausea. Negative for abdominal pain.   Skin: Negative for rash.   Neurological: Positive for dizziness.   All other systems reviewed and are negative.      Allergies:  Ativan   Gabapentin  Metoprolol  Pantoprazole  Latex  Oxycodone    Medications:  Effexor   Transderm   Inderal   Zofran   Antivert   Neurontin   Nexium   Xanax     Past Medical History:     Tinnitus   GERD   Hypertension   Anxiety   Pneumonia  Calculus of right kidney  Meniere's disease   Osteoporosis      Past Surgical History:    Arthroplasty knee right   Cataract surgery bilaterally   Hysterectomy      Family History:    Palsy   Esophageal cancer     Social History:  The patient presents to the ED alone.  The patient arrived via private car.     Physical Exam     Patient Vitals for the past 24 hrs:   BP Temp Temp src Pulse Resp SpO2 Height Weight   08/10/22 1730 (!) 171/83 -- -- -- -- 99 % -- --   08/10/22 1630 (!) 155/78 -- -- 69 --  L open CTR "97 % -- --   08/10/22 1600 (!) 149/82 -- -- 68 -- 97 % -- --   08/10/22 1530 (!) 156/84 -- -- 66 -- 97 % -- --   08/10/22 1515 (!) 152/90 -- -- 69 -- 98 % -- --   08/10/22 1401 (!) 144/82 98  F (36.7  C) Temporal 61 16 98 % 1.626 m (5' 4\") 63.5 kg (140 lb)     Physical Exam  General: Alert, appears well-developed and well-nourished. Cooperative.     In mild distress  HEENT:  Head:  Atraumatic  Ears:  External ears are normal  Mouth/Throat:  Oropharynx is without erythema or exudate and mucous membranes are moist.   Eyes:   Conjunctivae normal and EOM are normal. No scleral icterus.  CV:  Normal rate, regular rhythm, normal heart sounds and radial pulses are 2+ and symmetric.  No murmur.  Resp:  Breath sounds are clear bilaterally    Non-labored, no retractions or accessory muscle use  GI:  Abdomen is soft, no distension, no tenderness. No rebound or guarding.  No CVA tenderness bilaterally  MS:  Normal range of motion. No edema.    There is exquisite tenderness to the right anterior chest wall, intercostal muscles, just superior to the right breast, no mass detected.     Normal strength in all 4 extremities.     Back atraumatic.    No midline cervical, thoracic, or lumbar tenderness  Skin:  Warm and dry.  No rash or lesions noted.  Neuro: Alert. Normal strength.  GCS: 15  Psych:  Normal mood and affect.    Emergency Department Course   ECG  ECG results from 08/10/22   EKG 12-lead, tracing only     Value    Systolic Blood Pressure     Diastolic Blood Pressure     Ventricular Rate 61    Atrial Rate 61    ID Interval 158    QRS Duration 86        QTc 446    P Axis 65    R AXIS -52    T Axis 60    Interpretation ECG      Sinus rhythm  Left anterior fascicular block  Abnormal ECG  When compared with ECG of 26-MAY-2022 18:17,  Premature ventricular complexes are no longer Present         Imaging:  XR Chest 2 Views   Final Result   IMPRESSION: Stable cardiac silhouette. Decreased right airspace   disease with some " residual linear opacities, favor scarring.   Resolution of right pleural effusion. Left lung is clear. No   pneumothorax. No acute bony abnormality.      ABIGAIL VENTURA MD            SYSTEM ID:  [id number]        Report per radiology    Laboratory:  Labs Ordered and Resulted from Time of ED Arrival to Time of ED Departure   CBC WITH PLATELETS AND DIFFERENTIAL - Abnormal       Result Value    WBC Count 7.4      RBC Count 4.65      Hemoglobin 13.4      Hematocrit 44.1      MCV 95      MCH 28.8      MCHC 30.4 (*)     RDW 13.2      Platelet Count 280      % Neutrophils 61      % Lymphocytes 28      % Monocytes 7      % Eosinophils 3      % Basophils 1      % Immature Granulocytes 0      NRBCs per 100 WBC 0      Absolute Neutrophils 4.6      Absolute Lymphocytes 2.1      Absolute Monocytes 0.5      Absolute Eosinophils 0.2      Absolute Basophils 0.0      Absolute Immature Granulocytes 0.0      Absolute NRBCs 0.0     COMPREHENSIVE METABOLIC PANEL - Normal    Sodium 139      Potassium 4.2      Creatinine 0.63      Urea Nitrogen 18.0      Chloride 103      Carbon Dioxide (CO2) 26      Anion Gap 10      Glucose 97      Calcium 9.8      Protein Total 7.1      Albumin 4.1      Bilirubin Total 0.3      Alkaline Phosphatase 85      AST 25      ALT 18      GFR Estimate 90     TROPONIN T, HIGH SENSITIVITY - Normal    Troponin T, High Sensitivity 8     TSH WITH FREE T4 REFLEX - Normal    TSH 2.46          Emergency Department Course:     Reviewed:  I reviewed nursing notes, vitals and past medical history    Assessments:  1535 I obtained history and examined the patient as noted above.   1758 I explained findings to the patient and discussed discharge.     Interventions:  Medications   0.9% sodium chloride BOLUS (1,000 mLs Intravenous New Bag 8/10/22 1656)   acetaminophen (TYLENOL) tablet 650 mg (650 mg Oral Given 8/10/22 1656)   ibuprofen (ADVIL/MOTRIN) tablet 600 mg (600 mg Oral Given 8/10/22 1656)     Disposition:  The patient  was discharged to home.     Impression & Plan     Medical Decision Making:  Cynthia Arita is a 77 year old female who presents with chest pain and dizziness consistent with her chronic Meniere's disease.  The work up in the Emergency Department is negative.  I considered a broad differential diagnosis in this patient including life-threatening etiologies such as acute coronary syndrome, myocardial infarction, pulmonary embolism, acute aortic dissection, myocarditis, pericarditis, acute valvular insufficiency amongst others.  Other causes considered for this patient included pneumonia, pneumothorax, chest wall source, pericarditis, pleurisy, esophageal spasm, etc.  No serious etiology for the chest pain were detected today during this visit.  She did have some reproducible right anterior chest wall discomfort.  Thankfully no evidence of rib fractures.  No crepitus.  I do have high suspicion for musculoskeletal strain as patient is so exquisitely tender to the chest wall.  There is no evidence of shingles or rash.  Thankfully her dizziness seems consistent with her known Ménière's disease so we did not further work-up this mild chronic dizziness for the patient.  There is no new components that are worrisome for an acute neurologic process such as stroke or intracranial hemorrhage.  No further work-up indicated for the dizziness here today.  Close follow up with primary care is indicated should the pain continue, as further work up may be performed; this was made clear to the patient, who understands.     Diagnosis:    ICD-10-CM    1. Chest pain, unspecified type  R07.9    2. Right-sided chest wall pain  R07.89    3. Meniere's disease, unspecified laterality  H81.09        Discharge Medications:  New Prescriptions    No medications on file       Scribe Disclosure:  I, Joshua Kjer, am serving as a scribe at 3:13 PM on 8/10/2022 to document services personally performed by Ferdinand Kingsley MD based on my observations and  the provider's statements to me.            Ferdinand Kingsley MD  08/10/22 [id number]

## 2022-08-24 ENCOUNTER — APPOINTMENT (OUTPATIENT)
Dept: CARDIOLOGY | Facility: CLINIC | Age: 72
End: 2022-08-24

## 2023-03-15 NOTE — H&P PST ADULT - VISION (WITH CORRECTIVE LENSES IF THE PATIENT USUALLY WEARS THEM):
Sulfamethoxazole-Trimethoprim Oral Tablet 400-80 MG     Last Written Prescription Date:  9/20/22  Last Fill Quantity: 90,   # refills: 1  Last Office Visit : 1/24/23  Future Office visit:  4/25/23    Routing refill request to provider for review/approval because:  Drug not on the [facility], [facility] or [facility] refill protocol          Partially impaired: cannot see medication labels or newsprint, but can see obstacles in path, and the surrounding layout; can count fingers at arm's length/reading glasses

## 2023-06-21 ENCOUNTER — OUTPATIENT (OUTPATIENT)
Dept: OUTPATIENT SERVICES | Facility: HOSPITAL | Age: 73
LOS: 1 days | End: 2023-06-21
Payer: MEDICARE

## 2023-06-21 DIAGNOSIS — Z98.890 OTHER SPECIFIED POSTPROCEDURAL STATES: Chronic | ICD-10-CM

## 2023-06-21 DIAGNOSIS — Z87.81 PERSONAL HISTORY OF (HEALED) TRAUMATIC FRACTURE: Chronic | ICD-10-CM

## 2023-06-21 DIAGNOSIS — Z98.49 CATARACT EXTRACTION STATUS, UNSPECIFIED EYE: Chronic | ICD-10-CM

## 2023-06-21 DIAGNOSIS — R13.10 DYSPHAGIA, UNSPECIFIED: ICD-10-CM

## 2023-06-21 PROCEDURE — 74220 X-RAY XM ESOPHAGUS 1CNTRST: CPT

## 2023-06-21 PROCEDURE — 74220 X-RAY XM ESOPHAGUS 1CNTRST: CPT | Mod: 26

## 2023-10-10 ENCOUNTER — INPATIENT (INPATIENT)
Facility: HOSPITAL | Age: 73
LOS: 2 days | Discharge: HOME | DRG: 391 | End: 2023-10-13
Attending: INTERNAL MEDICINE | Admitting: INTERNAL MEDICINE
Payer: MEDICARE

## 2023-10-10 VITALS
SYSTOLIC BLOOD PRESSURE: 111 MMHG | RESPIRATION RATE: 22 BRPM | DIASTOLIC BLOOD PRESSURE: 68 MMHG | WEIGHT: 97 LBS | HEIGHT: 60 IN | OXYGEN SATURATION: 98 % | HEART RATE: 120 BPM | TEMPERATURE: 98 F

## 2023-10-10 DIAGNOSIS — Z98.49 CATARACT EXTRACTION STATUS, UNSPECIFIED EYE: Chronic | ICD-10-CM

## 2023-10-10 DIAGNOSIS — Z98.890 OTHER SPECIFIED POSTPROCEDURAL STATES: Chronic | ICD-10-CM

## 2023-10-10 DIAGNOSIS — K51.00 ULCERATIVE (CHRONIC) PANCOLITIS WITHOUT COMPLICATIONS: ICD-10-CM

## 2023-10-10 DIAGNOSIS — Z87.81 PERSONAL HISTORY OF (HEALED) TRAUMATIC FRACTURE: Chronic | ICD-10-CM

## 2023-10-10 LAB
ALBUMIN SERPL ELPH-MCNC: 3.3 G/DL — SIGNIFICANT CHANGE UP (ref 3.3–5)
ALP SERPL-CCNC: 64 U/L — SIGNIFICANT CHANGE UP (ref 30–120)
ALT FLD-CCNC: 20 U/L — SIGNIFICANT CHANGE UP (ref 10–60)
ANION GAP SERPL CALC-SCNC: 13 MMOL/L — SIGNIFICANT CHANGE UP (ref 5–17)
APPEARANCE UR: CLEAR — SIGNIFICANT CHANGE UP
APTT BLD: 27.5 SEC — SIGNIFICANT CHANGE UP (ref 24.5–35.6)
AST SERPL-CCNC: 29 U/L — SIGNIFICANT CHANGE UP (ref 10–40)
BACTERIA # UR AUTO: NEGATIVE /HPF — SIGNIFICANT CHANGE UP
BASOPHILS # BLD AUTO: 0.1 K/UL — SIGNIFICANT CHANGE UP (ref 0–0.2)
BASOPHILS NFR BLD AUTO: 1 % — SIGNIFICANT CHANGE UP (ref 0–2)
BILIRUB SERPL-MCNC: 0.5 MG/DL — SIGNIFICANT CHANGE UP (ref 0.2–1.2)
BILIRUB UR-MCNC: NEGATIVE — SIGNIFICANT CHANGE UP
BUN SERPL-MCNC: 8 MG/DL — SIGNIFICANT CHANGE UP (ref 7–23)
CALCIUM SERPL-MCNC: 9.4 MG/DL — SIGNIFICANT CHANGE UP (ref 8.4–10.5)
CHLORIDE SERPL-SCNC: 97 MMOL/L — SIGNIFICANT CHANGE UP (ref 96–108)
CO2 SERPL-SCNC: 24 MMOL/L — SIGNIFICANT CHANGE UP (ref 22–31)
COLOR SPEC: YELLOW — SIGNIFICANT CHANGE UP
CREAT SERPL-MCNC: 0.94 MG/DL — SIGNIFICANT CHANGE UP (ref 0.5–1.3)
DIFF PNL FLD: ABNORMAL
EGFR: 64 ML/MIN/1.73M2 — SIGNIFICANT CHANGE UP
EOSINOPHIL # BLD AUTO: 0.1 K/UL — SIGNIFICANT CHANGE UP (ref 0–0.5)
EOSINOPHIL NFR BLD AUTO: 1 % — SIGNIFICANT CHANGE UP (ref 0–6)
EPI CELLS # UR: PRESENT
FLUAV AG NPH QL: SIGNIFICANT CHANGE UP
FLUBV AG NPH QL: SIGNIFICANT CHANGE UP
GLUCOSE SERPL-MCNC: 105 MG/DL — HIGH (ref 70–99)
GLUCOSE UR QL: NEGATIVE MG/DL — SIGNIFICANT CHANGE UP
HCT VFR BLD CALC: 37.4 % — SIGNIFICANT CHANGE UP (ref 34.5–45)
HGB BLD-MCNC: 12.6 G/DL — SIGNIFICANT CHANGE UP (ref 11.5–15.5)
INR BLD: 0.99 RATIO — SIGNIFICANT CHANGE UP (ref 0.85–1.18)
KETONES UR-MCNC: ABNORMAL MG/DL
LACTATE SERPL-SCNC: 1.6 MMOL/L — SIGNIFICANT CHANGE UP (ref 0.7–2)
LEUKOCYTE ESTERASE UR-ACNC: NEGATIVE — SIGNIFICANT CHANGE UP
LIDOCAIN IGE QN: 14 U/L — LOW (ref 16–77)
LYMPHOCYTES # BLD AUTO: 0.2 K/UL — LOW (ref 1–3.3)
LYMPHOCYTES # BLD AUTO: 2 % — LOW (ref 13–44)
MANUAL SMEAR VERIFICATION: YES — SIGNIFICANT CHANGE UP
MCHC RBC-ENTMCNC: 28.9 PG — SIGNIFICANT CHANGE UP (ref 27–34)
MCHC RBC-ENTMCNC: 33.7 GM/DL — SIGNIFICANT CHANGE UP (ref 32–36)
MCV RBC AUTO: 85.8 FL — SIGNIFICANT CHANGE UP (ref 80–100)
MONOCYTES # BLD AUTO: 1.63 K/UL — HIGH (ref 0–0.9)
MONOCYTES NFR BLD AUTO: 16 % — HIGH (ref 2–14)
NEUTROPHILS # BLD AUTO: 8.14 K/UL — HIGH (ref 1.8–7.4)
NEUTROPHILS NFR BLD AUTO: 80 % — HIGH (ref 43–77)
NITRITE UR-MCNC: NEGATIVE — SIGNIFICANT CHANGE UP
NRBC # BLD: 0 /100 — SIGNIFICANT CHANGE UP (ref 0–0)
NRBC # BLD: SIGNIFICANT CHANGE UP /100 WBCS (ref 0–0)
PH UR: 6.5 — SIGNIFICANT CHANGE UP (ref 5–8)
PLAT MORPH BLD: NORMAL — SIGNIFICANT CHANGE UP
PLATELET # BLD AUTO: 218 K/UL — SIGNIFICANT CHANGE UP (ref 150–400)
PLATELET COUNT - ESTIMATE: NORMAL — SIGNIFICANT CHANGE UP
POTASSIUM SERPL-MCNC: 3.8 MMOL/L — SIGNIFICANT CHANGE UP (ref 3.5–5.3)
POTASSIUM SERPL-SCNC: 3.8 MMOL/L — SIGNIFICANT CHANGE UP (ref 3.5–5.3)
PROT SERPL-MCNC: 6.7 G/DL — SIGNIFICANT CHANGE UP (ref 6–8.3)
PROT UR-MCNC: NEGATIVE MG/DL — SIGNIFICANT CHANGE UP
PROTHROM AB SERPL-ACNC: 11.1 SEC — SIGNIFICANT CHANGE UP (ref 9.5–13)
RBC # BLD: 4.36 M/UL — SIGNIFICANT CHANGE UP (ref 3.8–5.2)
RBC # FLD: 14.1 % — SIGNIFICANT CHANGE UP (ref 10.3–14.5)
RBC BLD AUTO: NORMAL — SIGNIFICANT CHANGE UP
RBC CASTS # UR COMP ASSIST: 1 /HPF — SIGNIFICANT CHANGE UP (ref 0–4)
RSV RNA NPH QL NAA+NON-PROBE: SIGNIFICANT CHANGE UP
SARS-COV-2 RNA SPEC QL NAA+PROBE: SIGNIFICANT CHANGE UP
SODIUM SERPL-SCNC: 134 MMOL/L — LOW (ref 135–145)
SP GR SPEC: 1.03 — HIGH (ref 1–1.03)
TROPONIN I, HIGH SENSITIVITY RESULT: 8.9 NG/L — SIGNIFICANT CHANGE UP
UROBILINOGEN FLD QL: 0.2 MG/DL — SIGNIFICANT CHANGE UP (ref 0.2–1)
WBC # BLD: 10.18 K/UL — SIGNIFICANT CHANGE UP (ref 3.8–10.5)
WBC # FLD AUTO: 10.18 K/UL — SIGNIFICANT CHANGE UP (ref 3.8–10.5)
WBC UR QL: 2 /HPF — SIGNIFICANT CHANGE UP (ref 0–5)

## 2023-10-10 PROCEDURE — 99285 EMERGENCY DEPT VISIT HI MDM: CPT | Mod: FS

## 2023-10-10 PROCEDURE — 71275 CT ANGIOGRAPHY CHEST: CPT | Mod: 26,MA

## 2023-10-10 PROCEDURE — 71046 X-RAY EXAM CHEST 2 VIEWS: CPT | Mod: 26

## 2023-10-10 PROCEDURE — 74177 CT ABD & PELVIS W/CONTRAST: CPT | Mod: 26,MA

## 2023-10-10 RX ORDER — LOSARTAN POTASSIUM 100 MG/1
25 TABLET, FILM COATED ORAL DAILY
Refills: 0 | Status: DISCONTINUED | OUTPATIENT
Start: 2023-10-10 | End: 2023-10-13

## 2023-10-10 RX ORDER — ALPRAZOLAM 0.25 MG
0.25 TABLET ORAL ONCE
Refills: 0 | Status: DISCONTINUED | OUTPATIENT
Start: 2023-10-10 | End: 2023-10-10

## 2023-10-10 RX ORDER — BUDESONIDE AND FORMOTEROL FUMARATE DIHYDRATE 160; 4.5 UG/1; UG/1
2 AEROSOL RESPIRATORY (INHALATION) DAILY
Refills: 0 | Status: DISCONTINUED | OUTPATIENT
Start: 2023-10-10 | End: 2023-10-13

## 2023-10-10 RX ORDER — AMPICILLIN SODIUM AND SULBACTAM SODIUM 250; 125 MG/ML; MG/ML
3 INJECTION, POWDER, FOR SUSPENSION INTRAMUSCULAR; INTRAVENOUS ONCE
Refills: 0 | Status: COMPLETED | OUTPATIENT
Start: 2023-10-10 | End: 2023-10-10

## 2023-10-10 RX ORDER — MONTELUKAST 4 MG/1
10 TABLET, CHEWABLE ORAL DAILY
Refills: 0 | Status: DISCONTINUED | OUTPATIENT
Start: 2023-10-10 | End: 2023-10-13

## 2023-10-10 RX ORDER — ACETAMINOPHEN 500 MG
650 TABLET ORAL ONCE
Refills: 0 | Status: COMPLETED | OUTPATIENT
Start: 2023-10-10 | End: 2023-10-10

## 2023-10-10 RX ORDER — SODIUM CHLORIDE 9 MG/ML
1000 INJECTION, SOLUTION INTRAVENOUS
Refills: 0 | Status: DISCONTINUED | OUTPATIENT
Start: 2023-10-10 | End: 2023-10-13

## 2023-10-10 RX ORDER — IPRATROPIUM/ALBUTEROL SULFATE 18-103MCG
3 AEROSOL WITH ADAPTER (GRAM) INHALATION ONCE
Refills: 0 | Status: COMPLETED | OUTPATIENT
Start: 2023-10-10 | End: 2023-10-11

## 2023-10-10 RX ORDER — HEPARIN SODIUM 5000 [USP'U]/ML
5000 INJECTION INTRAVENOUS; SUBCUTANEOUS EVERY 8 HOURS
Refills: 0 | Status: DISCONTINUED | OUTPATIENT
Start: 2023-10-10 | End: 2023-10-13

## 2023-10-10 RX ORDER — ONDANSETRON 8 MG/1
4 TABLET, FILM COATED ORAL ONCE
Refills: 0 | Status: COMPLETED | OUTPATIENT
Start: 2023-10-10 | End: 2023-10-10

## 2023-10-10 RX ORDER — SODIUM CHLORIDE 9 MG/ML
1350 INJECTION INTRAMUSCULAR; INTRAVENOUS; SUBCUTANEOUS ONCE
Refills: 0 | Status: COMPLETED | OUTPATIENT
Start: 2023-10-10 | End: 2023-10-10

## 2023-10-10 RX ADMIN — Medication 0.25 MILLIGRAM(S): at 20:52

## 2023-10-10 RX ADMIN — SODIUM CHLORIDE 75 MILLILITER(S): 9 INJECTION, SOLUTION INTRAVENOUS at 20:52

## 2023-10-10 RX ADMIN — SODIUM CHLORIDE 1350 MILLILITER(S): 9 INJECTION INTRAMUSCULAR; INTRAVENOUS; SUBCUTANEOUS at 13:00

## 2023-10-10 RX ADMIN — SODIUM CHLORIDE 1350 MILLILITER(S): 9 INJECTION INTRAMUSCULAR; INTRAVENOUS; SUBCUTANEOUS at 12:27

## 2023-10-10 RX ADMIN — AMPICILLIN SODIUM AND SULBACTAM SODIUM 200 GRAM(S): 250; 125 INJECTION, POWDER, FOR SUSPENSION INTRAMUSCULAR; INTRAVENOUS at 15:40

## 2023-10-10 RX ADMIN — Medication 650 MILLIGRAM(S): at 21:26

## 2023-10-10 RX ADMIN — Medication 650 MILLIGRAM(S): at 20:52

## 2023-10-10 RX ADMIN — ONDANSETRON 4 MILLIGRAM(S): 8 TABLET, FILM COATED ORAL at 12:28

## 2023-10-10 RX ADMIN — Medication 650 MILLIGRAM(S): at 12:59

## 2023-10-10 RX ADMIN — Medication 260 MILLIGRAM(S): at 12:30

## 2023-10-10 RX ADMIN — Medication 650 MILLIGRAM(S): at 13:00

## 2023-10-10 RX ADMIN — AMPICILLIN SODIUM AND SULBACTAM SODIUM 3 GRAM(S): 250; 125 INJECTION, POWDER, FOR SUSPENSION INTRAMUSCULAR; INTRAVENOUS at 16:30

## 2023-10-10 NOTE — ED ADULT NURSE NOTE - OBJECTIVE STATEMENT
pt presents with complaint of abd pain/ diarrhea and " I feel dehydrated" Pt reports hx of Cancer finished 6 month  chemo few weeks ago. PT reports one episode of vomiting today.

## 2023-10-10 NOTE — ED ADULT TRIAGE NOTE - CHIEF COMPLAINT QUOTE
74 y/o female presents aox4 ambulatory sent to the ED by PMD for dehydration, vomiting and dyspnea eval.

## 2023-10-10 NOTE — H&P ADULT - ASSESSMENT
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         Pancolitis CT abdomen shows Pancolitis   Start patient on Zosyn   Follo wup C diff Stool cx     GI eval called  Follow up recs    Pericardial Effusion on CT   Patient asymptomatic   cards eval called  ??Echo

## 2023-10-10 NOTE — H&P ADULT - HISTORY OF PRESENT ILLNESS
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     Patient reports 1 week ago she got her infusion of Keytruda.  Patient reports the same time she did a colon cleanse.  Patient reports on Tuesday she started Ceftin for a cough that she has had for months.  Patient reports that today she started having diarrhea with nausea without vomiting.  Patient reports she was seen at urgent care on Saturday 10 7 who advised her to do a culture for C. difficile.  Patient reports at that time she started having nausea and 1 episode of vomiting last night.  Patient consulted her GI today Dr. Zapata who referred her to ED for evaluation.  Patient reports diffuse abdominal pain.  Patient took Tylenol yesterday which provided minimal relief. pt also notes sob that she has had since august. pt reports she currently took albuterol and ceftin for symptoms.  Patient denies fever, chest pain, dysuria

## 2023-10-10 NOTE — ED PROVIDER NOTE - CLINICAL SUMMARY MEDICAL DECISION MAKING FREE TEXT BOX
Patient complaining of 1 week of generalized abdominal pain associated with nausea vomiting and diarrhea.  Patient also complaining of shortness of breath.  Patient reports recent antibiotics.  Patient did a colon cleanse the day before her symptoms began.  Patient denies fevers chills chest pain urinary complaints    Plan EKG CTA chest CT abdomen pelvis labs IV fluids Ofirmev Zofran

## 2023-10-10 NOTE — ED PROVIDER NOTE - DIFFERENTIAL DIAGNOSIS
Differential including but not limited to MI PE gastritis enteritis pancreatitis colitis diverticulitis electrolyte abnormality dehydration Differential Diagnosis

## 2023-10-10 NOTE — ED PROVIDER NOTE - NSICDXPASTMEDICALHX_GEN_ALL_CORE_FT
PAST MEDICAL HISTORY:  Asthmatic bronchitis well-controlled, no recent exacerbations    Carpal tunnel syndrome left    Cervicalgia     COVID-19 Pt had Covid-19 3/2020, did not require hospitalization    GERD (gastroesophageal reflux disease)     Head and neck cancer tonsil - diagnosed 1/2020 - s/p 35 radiation treatments and 3 cycles of chemotherapy, followed by Dr Tovar ([facility])    Hypertension     Osteoarthritis     Pericardial effusion longstanding h/o small pericardial effusion - last Echo 2017 revealed trace pericardial effusion    Samter's triad

## 2023-10-10 NOTE — ED ADULT NURSE NOTE - NSFALLHARMRISKINTERV_ED_ALL_ED

## 2023-10-10 NOTE — ED PROVIDER NOTE - PATIENT'S PREFERRED PRONOUN
[Good understanding] : Patient has a good understanding of disease, goals and obesity follow-up plan [None] : None [de-identified] : continue diet and exercise Her/She

## 2023-10-10 NOTE — ED PROVIDER NOTE - NS ED ATTENDING STATEMENT MOD
This was a shared visit with the EDIL. I reviewed and verified the documentation and independently performed the documented:

## 2023-10-10 NOTE — ED PROVIDER NOTE - NSICDXPASTSURGICALHX_GEN_ALL_CORE_FT
PAST SURGICAL HISTORY:  H/O cataract extraction 2/2019 bilateral    H/O hand surgery right hand - joints fused    H/O sinus surgery 2018    History of bunionectomy bilateral    History of tibial fracture 1996 right - s/p ORIF    S/P arthroscopy of knee left

## 2023-10-10 NOTE — ED ADULT NURSE NOTE - CHIEF COMPLAINT QUOTE
72 y/o female presents aox4 ambulatory sent to the ED by PMD for dehydration, vomiting and dyspnea eval.

## 2023-10-10 NOTE — ED PROVIDER NOTE - OBJECTIVE STATEMENT
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  Patient reports 1 week ago she got her infusion of Keytruda.  Patient reports the same time she did a colon cleanse.  Patient reports on Tuesday she started Ceftin for a cough that she has had for months.  Patient reports that today she started having diarrhea with nausea without vomiting.  Patient reports she was seen at urgent care on Saturday 10 7 who advised her to do a culture for C. difficile.  Patient reports at that time she started having nausea and 1 episode of vomiting last night.  Patient consulted her GI today Dr. Zapata who referred her to ED for evaluation.  Patient reports diffuse abdominal pain.  Patient took Tylenol yesterday which provided minimal relief.  Patient denies fever, chest pain, dysuria. 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  Patient reports 1 week ago she got her infusion of Keytruda.  Patient reports the same time she did a colon cleanse.  Patient reports on Tuesday she started Ceftin for a cough that she has had for months.  Patient reports that today she started having diarrhea with nausea without vomiting.  Patient reports she was seen at urgent care on Saturday 10 7 who advised her to do a culture for C. difficile.  Patient reports at that time she started having nausea and 1 episode of vomiting last night.  Patient consulted her GI today Dr. Zapata who referred her to ED for evaluation.  Patient reports diffuse abdominal pain.  Patient took Tylenol yesterday which provided minimal relief. pt also notes sob that she has had since august. pt reports she currently took albuterol and ceftin for symptoms.  Patient denies fever, chest pain, dysuria.

## 2023-10-10 NOTE — ED ADULT NURSE NOTE - IS THE PATIENT ABLE TO BE SCREENED?
Diabetic patient presents for foot exam. Patient denies concerns at this time.     Lab Results   Component Value Date    HGBA1C 6.4 (H) 12/27/2016         Denies latex allergies.  Medications reviewed.  Tobacco history reviewed.    Past Medical History:   Diagnosis Date   • Adenomyosis    • Asthma    • DM (diabetes mellitus)     diet controlled   • Dysmenorrhea aug 2014    coming for ablation   • Failed moderate sedation during procedure     slow to arouse, possible, low BP   • IUD (intrauterine device) in place 9/14   • Obesity, unspecified    • Reflux    • Sinusitis, chronic         Yes

## 2023-10-10 NOTE — PATIENT PROFILE ADULT - CONTRAINDICATIONS & PRECAUTIONS (SELECT ALL THAT APPLY)
[address], [address], [place], [address]
Moderate to severe acute illness with or without fever. Delay administration of vaccination until patient has been afebrile or illness resolved for 24hrs

## 2023-10-11 LAB
HCV AB S/CO SERPL IA: 0.11 S/CO — SIGNIFICANT CHANGE UP (ref 0–0.99)
HCV AB SERPL-IMP: SIGNIFICANT CHANGE UP
NT-PROBNP SERPL-SCNC: 282 PG/ML — HIGH (ref 0–125)

## 2023-10-11 PROCEDURE — 93306 TTE W/DOPPLER COMPLETE: CPT | Mod: 26

## 2023-10-11 RX ORDER — PIPERACILLIN AND TAZOBACTAM 4; .5 G/20ML; G/20ML
3.38 INJECTION, POWDER, LYOPHILIZED, FOR SOLUTION INTRAVENOUS ONCE
Refills: 0 | Status: COMPLETED | OUTPATIENT
Start: 2023-10-11 | End: 2023-10-11

## 2023-10-11 RX ORDER — PIPERACILLIN AND TAZOBACTAM 4; .5 G/20ML; G/20ML
3.38 INJECTION, POWDER, LYOPHILIZED, FOR SOLUTION INTRAVENOUS EVERY 8 HOURS
Refills: 0 | Status: DISCONTINUED | OUTPATIENT
Start: 2023-10-11 | End: 2023-10-13

## 2023-10-11 RX ORDER — ALBUTEROL 90 UG/1
2.5 AEROSOL, METERED ORAL EVERY 6 HOURS
Refills: 0 | Status: DISCONTINUED | OUTPATIENT
Start: 2023-10-11 | End: 2023-10-13

## 2023-10-11 RX ORDER — LEVOTHYROXINE SODIUM 125 MCG
100 TABLET ORAL DAILY
Refills: 0 | Status: DISCONTINUED | OUTPATIENT
Start: 2023-10-11 | End: 2023-10-13

## 2023-10-11 RX ORDER — ONDANSETRON 8 MG/1
4 TABLET, FILM COATED ORAL EVERY 6 HOURS
Refills: 0 | Status: DISCONTINUED | OUTPATIENT
Start: 2023-10-11 | End: 2023-10-13

## 2023-10-11 RX ORDER — ACETAMINOPHEN 500 MG
650 TABLET ORAL EVERY 6 HOURS
Refills: 0 | Status: DISCONTINUED | OUTPATIENT
Start: 2023-10-11 | End: 2023-10-13

## 2023-10-11 RX ORDER — ALPRAZOLAM 0.25 MG
0.5 TABLET ORAL AT BEDTIME
Refills: 0 | Status: DISCONTINUED | OUTPATIENT
Start: 2023-10-11 | End: 2023-10-13

## 2023-10-11 RX ORDER — LEVOTHYROXINE SODIUM 125 MCG
1 TABLET ORAL
Refills: 0 | DISCHARGE

## 2023-10-11 RX ORDER — IPRATROPIUM/ALBUTEROL SULFATE 18-103MCG
3 AEROSOL WITH ADAPTER (GRAM) INHALATION EVERY 8 HOURS
Refills: 0 | Status: DISCONTINUED | OUTPATIENT
Start: 2023-10-11 | End: 2023-10-13

## 2023-10-11 RX ADMIN — Medication 600 MILLIGRAM(S): at 22:16

## 2023-10-11 RX ADMIN — Medication 200 MILLIGRAM(S): at 11:02

## 2023-10-11 RX ADMIN — BUDESONIDE AND FORMOTEROL FUMARATE DIHYDRATE 2 PUFF(S): 160; 4.5 AEROSOL RESPIRATORY (INHALATION) at 09:37

## 2023-10-11 RX ADMIN — HEPARIN SODIUM 5000 UNIT(S): 5000 INJECTION INTRAVENOUS; SUBCUTANEOUS at 17:16

## 2023-10-11 RX ADMIN — Medication 0.5 MILLIGRAM(S): at 22:16

## 2023-10-11 RX ADMIN — Medication 650 MILLIGRAM(S): at 14:38

## 2023-10-11 RX ADMIN — Medication 3 MILLILITER(S): at 09:37

## 2023-10-11 RX ADMIN — Medication 650 MILLIGRAM(S): at 21:50

## 2023-10-11 RX ADMIN — ALBUTEROL 2.5 MILLIGRAM(S): 90 AEROSOL, METERED ORAL at 20:32

## 2023-10-11 RX ADMIN — ONDANSETRON 4 MILLIGRAM(S): 8 TABLET, FILM COATED ORAL at 17:16

## 2023-10-11 RX ADMIN — Medication 650 MILLIGRAM(S): at 21:05

## 2023-10-11 RX ADMIN — LOSARTAN POTASSIUM 25 MILLIGRAM(S): 100 TABLET, FILM COATED ORAL at 10:59

## 2023-10-11 RX ADMIN — Medication 100 MICROGRAM(S): at 11:02

## 2023-10-11 RX ADMIN — Medication 3 MILLILITER(S): at 14:10

## 2023-10-11 RX ADMIN — PIPERACILLIN AND TAZOBACTAM 200 GRAM(S): 4; .5 INJECTION, POWDER, LYOPHILIZED, FOR SOLUTION INTRAVENOUS at 02:41

## 2023-10-11 RX ADMIN — Medication 650 MILLIGRAM(S): at 15:08

## 2023-10-11 RX ADMIN — SODIUM CHLORIDE 75 MILLILITER(S): 9 INJECTION, SOLUTION INTRAVENOUS at 17:17

## 2023-10-11 RX ADMIN — PIPERACILLIN AND TAZOBACTAM 25 GRAM(S): 4; .5 INJECTION, POWDER, LYOPHILIZED, FOR SOLUTION INTRAVENOUS at 06:23

## 2023-10-11 RX ADMIN — SODIUM CHLORIDE 75 MILLILITER(S): 9 INJECTION, SOLUTION INTRAVENOUS at 09:37

## 2023-10-11 RX ADMIN — PIPERACILLIN AND TAZOBACTAM 25 GRAM(S): 4; .5 INJECTION, POWDER, LYOPHILIZED, FOR SOLUTION INTRAVENOUS at 17:17

## 2023-10-11 RX ADMIN — MONTELUKAST 10 MILLIGRAM(S): 4 TABLET, CHEWABLE ORAL at 11:02

## 2023-10-11 NOTE — CARE COORDINATION ASSESSMENT. - SOURCES OF SUPPORT FOR PATIENT
Son in [place] -  offered to contact friends/ family for transition planning and pt. declined./adult child(sruthi)/friend(s)

## 2023-10-11 NOTE — CONSULT NOTE ADULT - ASSESSMENT
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     pericardial eff  pleural eff  HTN  mets ca  head and neck ca  mets to lung  GERD  Asthma    follows with 2 pulm MDs  Dr Ruiz in [place] and [place] [facility]  pt is s/p Chemo and is on Immuno - Keytruda  NEBS and Singulair and Robitussin PRN  monitor VS and HD  pleural eff noted - no urgency in pleurocentesis - may follow up outpatient for decision - unless becomes symptomatic  Incentive luis  assist with needs  cardio eval  CT reviewed with the patient

## 2023-10-11 NOTE — CARE COORDINATION ASSESSMENT. - LIVING ARRANGEMENTS, PROFILE
Lives in Apartment alone with 14 steps to enter a Condo, then one level.     Uses no DME / independent ambulation.  / Drives and her car is in [place] [facility] parking lot.  Hopes to drive herself home when cleared./apartment

## 2023-10-11 NOTE — CARE COORDINATION ASSESSMENT. - NSDCPLANSERVICES_GEN_ALL_CORE
Per EMR MD notes: Colitis/ B/L Pleural effusions  on IV Zosyn / nebulizer for Asthma, Is on room air.    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   Patient reports 1 week ago she got her infusion of Keytruda.  Pt. on Ceftin for a cough PTA.  Patient reports she was seen at urgent care on Saturday 10/ 7 did culture for C. difficile.  had nausea and 1 episode of vomiting.  Patient consulted her GI Dr. Zapata who referred her to ED for evaluation.   ---------  CM met with pt. at ED bedside for assessment. CM introduced self and role of CM and provided CM contact information.   Pt. A&O x4 states she lives alone and is independent with no device.  Drove herself to Hospital and hopes to drive home.  She lives alone in a condo with 14 steps then one level.  Has friends local and son in [place].  Declined CM contact family/ friends.  Pt. states she does not anticipate any needs , but asking if Medicare would cover home care if needed.    CM answered all questions accordingly.  Explained need for skilled service to be eligible.  States she has no HCP, No DME needed PTA.  Drives herself to appointments / shopping.    Pharmacy is [address]  PCP is Dr. Del Rosario. FELIZ  cardio [facility]  Pul Dr. Ruiz in [place] / and one at [facility]   Oncology [facility] Dr Tovar,  GI Dr. Benny Baer MD- [facility]  d/c needs and date TBD.  CM team available./Anticipated Needs Unclear at Present

## 2023-10-11 NOTE — PROGRESS NOTE ADULT - ASSESSMENT
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         Pancolitis CT abdomen shows Pancolitis   Start patient on Zosyn   Follo wup C diff Stool cx     GI eval appreciated       Pericardial Effusion on CT   Patient asymptomatic   cards eval appreciated   ??Echo BNP levels

## 2023-10-11 NOTE — CARE COORDINATION ASSESSMENT. - NSCAREPROVIDERS_GEN_ALL_CORE_FT
CARE PROVIDERS:  Accepting Physician: Renetta Rocha  Administration: Viri Parr  Admitting: Renetta Rocha  Attending: Renetta Rocha  Cardiology Technician: Alia Robert  Case Management: Shakira Trejo  Consultant: Weil, Patricia  Consultant: Mehul Jimenez  Consultant: Robert Stuart  Consultant: You Richards  Covering Team: Mary Almaraz  ED ACP: Leann Esquivel  ED Attending: Dominic Doe  ED Nurse: Elena Bales  Nurse: Matias Quispe  Nurse: Estefania Liu  Nurse: Holly Rangel  Nurse: Deepali Langston  Nurse: Malinda Camp  Override: Deepali Langston  PCA/Nursing Assistant: Viri Hamlin  Registered Dietitian: Umm Santillan  Respiratory Therapy: Diaz Marrero  : Darlyn Hammer  : Shari Tucker// Supp. Assoc.: Curtis Royal Jr.

## 2023-10-11 NOTE — CONSULT NOTE ADULT - ASSESSMENT
The patient is a 73 year old female with a history of HTN, pericardial effusion, asthma, metastatic throat cancer, radiation pneumonitis who presents with abdominal pain, nausea, diarrhea.    Plan:  - ECG with low voltage  - CTA chest with moderate sized pericardial effusion and bilateral pleural effusions  - The patient has a longstanding history of small pericardial effusion  - Check echo  - Check BNP  - Pleural effusions - unclear etiology. No signs of pulm edema on imaging. Pulm eval  - Continue losartan 25 mg daily  - Abdominal work-up as indicated

## 2023-10-11 NOTE — CONSULT NOTE ADULT - SUBJECTIVE AND OBJECTIVE BOX
History of Present Illness: The patient is a 73 year old female with a history of HTN, pericardial effusion, asthma, metastatic throat cancer, radiation pneumonitis who presents with abdominal pain, nausea, diarrhea. She has had a worsening cough and shortness of breath over the last few weeks. She was treated with oral antibiotics twice. She developed abdominal symptoms after. No chest pain. She has a longstanding history of pericardial effusion for years.    Past Medical/Surgical History:  HTN, pericardial effusion, asthma, metastatic throat cancer, radiation pneumonitis     Medications:  Home Medications:  albuterol 2.5 mg/3 mL (0.083%) inhalation solution: 3 milliliter(s) inhaled every 6 hours, As Needed (23 Nov 2020 13:03)  cetirizine 10 mg oral tablet: 1 tab(s) orally once a day (23 Nov 2020 13:03)  Fosamax 70 mg oral tablet: 1 tab(s) orally once a week - Saturdays (23 Nov 2020 13:03)  losartan 25 mg oral tablet: 1 tab(s) orally once a day (23 Nov 2020 13:03)  omeprazole 40 mg oral delayed release capsule: 1 cap(s) orally once a day (23 Nov 2020 13:03)  Singulair 10 mg oral tablet: 1 tab(s) orally once a day (23 Nov 2020 13:03)  Spiriva 18 mcg inhalation capsule: 1 cap(s) inhaled once a day (23 Nov 2020 13:03)  Symbicort 80 mcg-4.5 mcg/inh inhalation aerosol: 2 puff(s) inhaled once a day (23 Nov 2020 13:03)  Xolair 150 mg subcutaneous injection: 1 dose(s) subcutaneous once a month - last injection was 2 weeks ago (23 Nov 2020 13:03)      Family History: Non-contributory family history of premature cardiovascular atherosclerotic disease    Social History: No tobacco, alcohol or drug use    Review of Systems:  General: No fevers, chills, weight gain  Skin: No rashes, color changes  Cardiovascular: No chest pain, orthopnea  Respiratory: +shortness of breath, +cough  Gastrointestinal: +nausea, +abdominal pain  Genitourinary: No incontinence, pain with urination  Musculoskeletal: No pain, swelling, decreased range of motion  Neurological: No headache, weakness  Psychiatric: No depression, anxiety  Endocrine: No weight gain, increased thirst  All other systems are comprehensively negative.    Physical Exam:  Vitals:        Vital Signs Last 24 Hrs  T(C): 36.8 (11 Oct 2023 06:30), Max: 36.9 (10 Oct 2023 17:44)  T(F): 98.2 (11 Oct 2023 06:30), Max: 98.5 (10 Oct 2023 17:44)  HR: 91 (11 Oct 2023 06:30) (91 - 120)  BP: 126/74 (11 Oct 2023 06:30) (111/68 - 129/76)  BP(mean): 82 (10 Oct 2023 17:33) (82 - 82)  RR: 17 (11 Oct 2023 06:30) (17 - 22)  SpO2: 94% (11 Oct 2023 06:30) (94% - 99%)  Parameters below as of 11 Oct 2023 02:05  Patient On (Oxygen Delivery Method): room air  General: NAD  HEENT: MMM  Neck: No JVD, no carotid bruit  Lungs: Decreased right side  CV: RRR, nl S1/S2, no M/R/G  Abdomen: S/NT/ND, +BS  Extremities: No LE edema, no cyanosis  Neuro: AAOx3, non-focal  Skin: No rash    Labs:                        12.6   10.18 )-----------( 218      ( 10 Oct 2023 12:32 )             37.4     10-10    134<L>  |  97  |  8   ----------------------------<  105<H>  3.8   |  24  |  0.94    Ca    9.4      10 Oct 2023 12:32    TPro  6.7  /  Alb  3.3  /  TBili  0.5  /  DBili  x   /  AST  29  /  ALT  20  /  AlkPhos  64  10-10        PT/INR - ( 10 Oct 2023 12:32 )   PT: 11.1 sec;   INR: 0.99 ratio         PTT - ( 10 Oct 2023 12:32 )  PTT:27.5 sec    ECG/Telemetry: Sinus tachycardia, low voltage

## 2023-10-11 NOTE — CONSULT NOTE ADULT - SUBJECTIVE AND OBJECTIVE BOX
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     Patient reports 1 week ago she got her infusion of Keytruda.  Patient reports the same time she did a colon cleanse.  Patient reports on Tuesday she started Ceftin for a cough that she has had for months.  Patient reports that today she started having diarrhea with nausea without vomiting.  Patient reports she was seen at urgent care on Saturday 10 7 who advised her to do a culture for C. difficile.  Patient reports at that time she started having nausea and 1 episode of vomiting last night.  Patient consulted her GI today Dr. Zapata who referred her to ED for evaluation.  Patient reports diffuse abdominal pain.  Patient took Tylenol yesterday which provided minimal relief. pt also notes sob that she has had since august. pt reports she currently took albuterol and ceftin for symptoms.  Patient denies fever, chest pain, dysuria (10 Oct 2023 17:33)      aspirin (Short breath)  NSAIDs (Short breath)      acetaminophen     Tablet .. 650 milliGRAM(s) Oral every 6 hours  albuterol/ipratropium for Nebulization 3 milliLiter(s) Nebulizer every 8 hours  budesonide  80 MICROgram(s)/formoterol 4.5 MICROgram(s) Inhaler 2 Puff(s) Inhalation daily  guaiFENesin Oral Liquid (Sugar-Free) 200 milliGRAM(s) Oral every 6 hours PRN  heparin   Injectable 5000 Unit(s) SubCutaneous every 8 hours  lactated ringers. 1000 milliLiter(s) IV Continuous <Continuous>  levothyroxine 100 MICROGram(s) Oral daily  losartan 25 milliGRAM(s) Oral daily  montelukast 10 milliGRAM(s) Oral daily  piperacillin/tazobactam IVPB.. 3.375 Gram(s) IV Intermittent every 8 hours        FAMILY HISTORY:        Review of Systems:    General:  No wt loss, fevers, chills, night sweats,fatigue,   Eyes:  Good vision, no reported pain  ENT:  No sore throat, pain, runny nose, dysphagia  CV:  No pain, palpitatioins, hypo/hypertension  Resp:  No dyspnea, cough, tachypnea, wheezing  :  No pain, bleeding, incontinence, nocturia  Muscle:  No pain, weakness  Neuro:  No weakness, tingling, memory problems  Psych:  No fatigue, insomnia, mood problems, depression  Endocrine:  No polyuria, polydypsia, cold/heat intolerance  Heme:  No petechiae, ecchymosis, easy bruisability  Skin:  No rash, tattoos, scars, edema    Relevant Family History:       Relevant Social History:       Physical Exam:    Vital Signs:  Vital Signs Last 24 Hrs  T(C): 37.1 (11 Oct 2023 10:11), Max: 37.1 (11 Oct 2023 10:11)  T(F): 98.7 (11 Oct 2023 10:11), Max: 98.7 (11 Oct 2023 10:11)  HR: 96 (11 Oct 2023 10:11) (91 - 120)  BP: 131/84 (11 Oct 2023 10:11) (111/68 - 131/84)  BP(mean): 82 (10 Oct 2023 17:33) (82 - 82)  RR: 18 (11 Oct 2023 10:11) (17 - 22)  SpO2: 100% (11 Oct 2023 10:11) (94% - 100%)    Parameters below as of 11 Oct 2023 10:11  Patient On (Oxygen Delivery Method): room air      Daily Height in cm: 152.4 (10 Oct 2023 17:33)    Daily     General:  Appears stated age, well-groomed, well-nourished, no distress  HEENT:  NC/AT,  conjunctivae clear and pink, no thyromegaly, nodules, adenopathy, no JVD  Chest:  Full & symmetric excursion, no increased effort, breath sounds clear  Cardiovascular:  Regular rhythm, S1, S2, no murmur/rub/S3/S4, no abdominal bruit, no edema  Abdomen:  Soft, non-tender, non-distended, normoactive bowel sounds,  no masses ,no hepatosplenomeagaly, no signs of chronic liver disease  Extremities:  no cyanosis,clubbing or edema  Skin:  No rash/erythema/ecchymoses/petechiae/wounds/abscess/warm/dry  Neuro/Psych:  Alert, oriented, no asterixis, no tremor, no encephalopathy    Laboratory:                            12.6   10.18 )-----------( 218      ( 10 Oct 2023 12:32 )             37.4     10-10    134<L>  |  97  |  8   ----------------------------<  105<H>  3.8   |  24  |  0.94    Ca    9.4      10 Oct 2023 12:32    TPro  6.7  /  Alb  3.3  /  TBili  0.5  /  DBili  x   /  AST  29  /  ALT  20  /  AlkPhos  64  10-10    LIVER FUNCTIONS - ( 10 Oct 2023 12:32 )  Alb: 3.3 g/dL / Pro: 6.7 g/dL / ALK PHOS: 64 U/L / ALT: 20 U/L / AST: 29 U/L / GGT: x           PT/INR - ( 10 Oct 2023 12:32 )   PT: 11.1 sec;   INR: 0.99 ratio         PTT - ( 10 Oct 2023 12:32 )  PTT:27.5 sec  Urinalysis Basic - ( 10 Oct 2023 14:24 )    Color: Yellow / Appearance: Clear / S.035 / pH: x  Gluc: x / Ketone: Trace mg/dL  / Bili: Negative / Urobili: 0.2 mg/dL   Blood: x / Protein: Negative mg/dL / Nitrite: Negative   Leuk Esterase: Negative / RBC: 1 /HPF / WBC 2 /HPF   Sq Epi: x / Non Sq Epi: x / Bacteria: Negative /HPF        Imaging:

## 2023-10-11 NOTE — CARE COORDINATION ASSESSMENT. - ADDITIONAL HEALTH CARE SERVICES UTILIZED PRIOR TO ADMISSION
Right Mediport goes for every 3 week K[place]ura treatments at [facility] [place] with Dr. HELLER.  Also has a pulmonary doctor there./chemotherapy

## 2023-10-11 NOTE — CARE COORDINATION ASSESSMENT. - NSPASTMEDSURGHISTORY_GEN_ALL_CORE_FT
PAST MEDICAL & SURGICAL HISTORY:  COVID-19  Pt had Covid-19 3/2020, did not require hospitalization      Carpal tunnel syndrome  left      Cervicalgia      Pericardial effusion  longstanding h/o small pericardial effusion - last Echo 2017 revealed trace pericardial effusion      Head and neck cancer  tonsil - diagnosed 1/2020 - s/p 35 radiation treatments and 3 cycles of chemotherapy, followed by Dr Tovar ([facility])      GERD (gastroesophageal reflux disease)      Asthmatic bronchitis  well-controlled, no recent exacerbations      Osteoarthritis      Hypertension      Samter's triad      H/O cataract extraction  2/2019 bilateral      S/P arthroscopy of knee  left      H/O sinus surgery  2018      History of tibial fracture  1996 right - s/p ORIF      History of bunionectomy  bilateral      H/O hand surgery  right hand - joints fused

## 2023-10-11 NOTE — CONSULT NOTE ADULT - SUBJECTIVE AND OBJECTIVE BOX
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   PAST MEDICAL & SURGICAL HISTORY:  Samter's triad    Hypertension    Osteoarthritis    Asthmatic bronchitis  well-controlled, no recent exacerbations    GERD (gastroesophageal reflux disease)    Head and neck cancer  tonsil - diagnosed 1/2020 - s/p 35 radiation treatments and 3 cycles of chemotherapy, followed by Dr Tovar ([facility])    Pericardial effusion  longstanding h/o small pericardial effusion - last Echo 2017 revealed trace pericardial effusion    Cervicalgia    Carpal tunnel syndrome  left    COVID-19  Pt had Covid-19 3/2020, did not require hospitalization    H/O hand surgery  right hand - joints fused    History of bunionectomy  bilateral    History of tibial fracture  1996 right - s/p ORIF    H/O sinus surgery  2018    S/P arthroscopy of knee  left    H/O cataract extraction  2/2019 bilateral     Social History:  · Substance use	No     Tobacco Screening:  · Core Measure Site	No  · Has the patient used tobacco in the past 30 days?	No    Risk Assessment:    Present on Admission:  Deep Venous Thrombosis	no  Pulmonary Embolus	no  Urinary Catheter	no  Central Venous Catheter/PICC Line	no  Surgical Site Incision	no  Pressure Ulcer(s)	no     HIV Screening:  · In accordance with [place] State law, we offer every patient who comes to our ED an HIV test. Would you like to be tested today?	Unable to answer due to medical condition/unresponsive/etc...     Social History:  · Marital Status	  · Occupation	Retired  · Lives With	alone     Substance Use History:  · Substance Use	caffeine  · Caffeine Type	coffee  · Caffeine Amount/Frequency	3-4 cups/cans per day  · Caffeine Withdrawal Pattern	denies     Alcohol Use History:  · Have you ever consumed alcohol	never     Tobacco Usage:  · Tobacco Usage: Former smoker  · Tobacco Type: cigarettes  quit 1970  · Number of Packs per Day: 0.5  · Number of yrs: 4  · Pack yrs: 2    Presurgical Screening:    Cardiovascular:  · Activity	ADLs, golf, walks 2-4 miles  · Energy expenditure (mets)	8.33 per DASI Questionnaire  · Symptoms	none     Cardiac Tests:  · Last Echocardiogram	2017 trace MR, mild TR, trace pericardial effusion  · Last Stress Test	2019 normal study, METS=9  · Last Cardiac Angiogram/Imaging	denies        Medication list         MEDICATIONS  (STANDING):  albuterol/ipratropium for Nebulization. 3 milliLiter(s) Nebulizer once  budesonide  80 MICROgram(s)/formoterol 4.5 MICROgram(s) Inhaler 2 Puff(s) Inhalation daily  heparin   Injectable 5000 Unit(s) SubCutaneous every 8 hours  lactated ringers. 1000 milliLiter(s) (75 mL/Hr) IV Continuous <Continuous>  losartan 25 milliGRAM(s) Oral daily  montelukast 10 milliGRAM(s) Oral daily  piperacillin/tazobactam IVPB.. 3.375 Gram(s) IV Intermittent every 8 hours    MEDICATIONS  (PRN):         Vitals log        ICU Vital Signs Last 24 Hrs  T(C): 36.8 (11 Oct 2023 06:30), Max: 36.9 (10 Oct 2023 17:44)  T(F): 98.2 (11 Oct 2023 06:30), Max: 98.5 (10 Oct 2023 17:44)  HR: 91 (11 Oct 2023 06:30) (91 - 120)  BP: 126/74 (11 Oct 2023 06:30) (111/68 - 129/76)  BP(mean): 82 (10 Oct 2023 17:33) (82 - 82)  ABP: --  ABP(mean): --  RR: 17 (11 Oct 2023 06:30) (17 - 22)  SpO2: 94% (11 Oct 2023 06:30) (94% - 99%)    O2 Parameters below as of 11 Oct 2023 02:05  Patient On (Oxygen Delivery Method): room air                 Input and Output:  I&O's Detail      Lab Data                        12.6   10.18 )-----------( 218      ( 10 Oct 2023 12:32 )             37.4     10-10    134<L>  |  97  |  8   ----------------------------<  105<H>  3.8   |  24  |  0.94    Ca    9.4      10 Oct 2023 12:32    TPro  6.7  /  Alb  3.3  /  TBili  0.5  /  DBili  x   /  AST  29  /  ALT  20  /  AlkPhos  64  10-10            Review of Systems	  weakness  abd pain    Objective     Physical Examination        Pertinent Lab findings & Imaging      Tariq:  NO   Adequate UO     I&O's Detail           Discussed with:     Cultures:	        Radiology      ACC: [id number] EXAM:  CT ABDOMEN AND PELVIS IC   ORDERED BY: YVES MENDOZA     ACC: [id number] EXAM:  CT ANGIO CHEST PULM ART WAWIC   ORDERED BY:   YVES MENDOZA     PROCEDURE DATE:  10/10/2023          INTERPRETATION:  CLINICAL INFORMATION: Abdominal pain, nausea, vomiting   and diarrhea. Dyspnea. Throat cancer with lung metastases. Status post   Keytruda infusion one week ago.    COMPARISON: None.    CONTRAST/COMPLICATIONS:  IV Contrast: IV contrast documented in unlinked concurrent exam   (accession [id number]), Omnipaque 350 (accession [id number])  90 cc   administered   10 cc discarded  Oral Contrast: NONE  Complications: None reported at time of study completion    PROCEDURE:  CT Angiography of the Chest was performed followed by portal venous phase   imaging of the Abdomen and Pelvis.  Sagittal and coronal reformats were performed as well as 3D (MIP)   reconstructions.    FINDINGS:  CHEST:  LUNGS AND LARGE AIRWAYS: Patent central airways. Complete right middle   lobe atelectasis. No pulmonary nodules. Status post right upper lobe   wedge resection.  PLEURA: Moderate right and small left pleural effusions.  VESSELS: No pulmonary embolism. Atherosclerotic arterial calcifications,   including coronary artery calcification.  HEART: Heart size is normal. Moderate pericardial effusion.  MEDIASTINUM AND TONY: No lymphadenopathy.  CHEST WALL AND LOWER NECK: Within normal limits.    ABDOMEN AND PELVIS:  LIVER: Within normal limits.  BILE DUCTS: Normal caliber.  GALLBLADDER: Within normal limits.  SPLEEN: Within normal limits.  PANCREAS: Within normal limits.  ADRENALS: Within normal limits.  KIDNEYS/URETERS: Within normal limits.    BLADDER: Within normal limits.  REPRODUCTIVE ORGANS: Fibroid uterus.    BOWEL: No bowel obstruction.. Pancolonic and rectal mural thickening.   Moderate hiatal hernia. Appendix not definitively visualized.  PERITONEUM: No ascites.  VESSELS: Within normal limits.  RETROPERITONEUM/LYMPH NODES: No lymphadenopathy.  ABDOMINAL WALL: Within normal limits.  BONES: Old fractures of the right seventh and eighth ribs.      IMPRESSION:  Pancolitis.  No pulmonary embolism.  Moderate pericardial effusion.  Moderate right and small left pleural effusions.          --- End of Report ---            FERNANDA BRODY MD; Attending Radiologist  This document has been electronically signed. 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   PAST MEDICAL & SURGICAL HISTORY:  Samter's triad    Hypertension    Osteoarthritis    Asthmatic bronchitis  well-controlled, no recent exacerbations    GERD (gastroesophageal reflux disease)    Head and neck cancer  tonsil - diagnosed 1/2020 - s/p 35 radiation treatments and 3 cycles of chemotherapy, followed by Dr Tovar ([facility])    Pericardial effusion  longstanding h/o small pericardial effusion - last Echo 2017 revealed trace pericardial effusion    Cervicalgia    Carpal tunnel syndrome  left    COVID-19  Pt had Covid-19 3/2020, did not require hospitalization    H/O hand surgery  right hand - joints fused    History of bunionectomy  bilateral    History of tibial fracture  1996 right - s/p ORIF    H/O sinus surgery  2018    S/P arthroscopy of knee  left    H/O cataract extraction  2/2019 bilateral     Social History:  · Substance use	No     Tobacco Screening:  · Core Measure Site	No  · Has the patient used tobacco in the past 30 days?	No    Risk Assessment:    Present on Admission:  Deep Venous Thrombosis	no  Pulmonary Embolus	no  Urinary Catheter	no  Central Venous Catheter/PICC Line	no  Surgical Site Incision	no  Pressure Ulcer(s)	no     HIV Screening:  · In accordance with [place] law, we offer every patient who comes to our ED an HIV test. Would you like to be tested today?	Unable to answer due to medical condition/unresponsive/etc...     Social History:  · Marital Status	  · Occupation	Retired  · Lives With	alone     Substance Use History:  · Substance Use	caffeine  · Caffeine Type	coffee  · Caffeine Amount/Frequency	3-4 cups/cans per day  · Caffeine Withdrawal Pattern	denies     Alcohol Use History:  · Have you ever consumed alcohol	never     Tobacco Usage:  · Tobacco Usage: Former smoker  · Tobacco Type: cigarettes  quit 1970  · Number of Packs per Day: 0.5  · Number of yrs: 4  · Pack yrs: 2    Presurgical Screening:    Cardiovascular:  · Activity	ADLs, golf, walks 2-4 miles  · Energy expenditure (mets)	8.33 per DASI Questionnaire  · Symptoms	none     Cardiac Tests:  · Last Echocardiogram	2017 trace MR, mild TR, trace pericardial effusion  · Last Stress Test	2019 normal study, METS=9  · Last Cardiac Angiogram/Imaging	denies        Medication list         MEDICATIONS  (STANDING):  albuterol/ipratropium for Nebulization. 3 milliLiter(s) Nebulizer once  budesonide  80 MICROgram(s)/formoterol 4.5 MICROgram(s) Inhaler 2 Puff(s) Inhalation daily  heparin   Injectable 5000 Unit(s) SubCutaneous every 8 hours  lactated ringers. 1000 milliLiter(s) (75 mL/Hr) IV Continuous <Continuous>  losartan 25 milliGRAM(s) Oral daily  montelukast 10 milliGRAM(s) Oral daily  piperacillin/tazobactam IVPB.. 3.375 Gram(s) IV Intermittent every 8 hours    MEDICATIONS  (PRN):         Vitals log        ICU Vital Signs Last 24 Hrs  T(C): 36.8 (11 Oct 2023 06:30), Max: 36.9 (10 Oct 2023 17:44)  T(F): 98.2 (11 Oct 2023 06:30), Max: 98.5 (10 Oct 2023 17:44)  HR: 91 (11 Oct 2023 06:30) (91 - 120)  BP: 126/74 (11 Oct 2023 06:30) (111/68 - 129/76)  BP(mean): 82 (10 Oct 2023 17:33) (82 - 82)  ABP: --  ABP(mean): --  RR: 17 (11 Oct 2023 06:30) (17 - 22)  SpO2: 94% (11 Oct 2023 06:30) (94% - 99%)    O2 Parameters below as of 11 Oct 2023 02:05  Patient On (Oxygen Delivery Method): room air                 Input and Output:  I&O's Detail      Lab Data                        12.6   10.18 )-----------( 218      ( 10 Oct 2023 12:32 )             37.4     10-10    134<L>  |  97  |  8   ----------------------------<  105<H>  3.8   |  24  |  0.94    Ca    9.4      10 Oct 2023 12:32    TPro  6.7  /  Alb  3.3  /  TBili  0.5  /  DBili  x   /  AST  29  /  ALT  20  /  AlkPhos  64  10-10            Review of Systems	  weakness  abd pain    Objective     Physical Examination    heart s1s2  lung dec BS  head nc  verbal  alert  cn grossly int      Pertinent Lab findings & Imaging      Tariq:  NO   Adequate UO     I&O's Detail           Discussed with:     Cultures:	        Radiology      ACC: [id number] EXAM:  CT ABDOMEN AND PELVIS IC   ORDERED BY: YVES MENDOZA     ACC: [id number] EXAM:  CT ANGIO CHEST PULM ART WAWIC   ORDERED BY:   YVES MENDOZA     PROCEDURE DATE:  10/10/2023          INTERPRETATION:  CLINICAL INFORMATION: Abdominal pain, nausea, vomiting   and diarrhea. Dyspnea. Throat cancer with lung metastases. Status post   Keytruda infusion one week ago.    COMPARISON: None.    CONTRAST/COMPLICATIONS:  IV Contrast: IV contrast documented in unlinked concurrent exam   (accession [id number]), Omnipaque 350 (accession [id number])  90 cc   administered   10 cc discarded  Oral Contrast: NONE  Complications: None reported at time of study completion    PROCEDURE:  CT Angiography of the Chest was performed followed by portal venous phase   imaging of the Abdomen and Pelvis.  Sagittal and coronal reformats were performed as well as 3D (MIP)   reconstructions.    FINDINGS:  CHEST:  LUNGS AND LARGE AIRWAYS: Patent central airways. Complete right middle   lobe atelectasis. No pulmonary nodules. Status post right upper lobe   wedge resection.  PLEURA: Moderate right and small left pleural effusions.  VESSELS: No pulmonary embolism. Atherosclerotic arterial calcifications,   including coronary artery calcification.  HEART: Heart size is normal. Moderate pericardial effusion.  MEDIASTINUM AND TONY: No lymphadenopathy.  CHEST WALL AND LOWER NECK: Within normal limits.    ABDOMEN AND PELVIS:  LIVER: Within normal limits.  BILE DUCTS: Normal caliber.  GALLBLADDER: Within normal limits.  SPLEEN: Within normal limits.  PANCREAS: Within normal limits.  ADRENALS: Within normal limits.  KIDNEYS/URETERS: Within normal limits.    BLADDER: Within normal limits.  REPRODUCTIVE ORGANS: Fibroid uterus.    BOWEL: No bowel obstruction.. Pancolonic and rectal mural thickening.   Moderate hiatal hernia. Appendix not definitively visualized.  PERITONEUM: No ascites.  VESSELS: Within normal limits.  RETROPERITONEUM/LYMPH NODES: No lymphadenopathy.  ABDOMINAL WALL: Within normal limits.  BONES: Old fractures of the right seventh and eighth ribs.      IMPRESSION:  Pancolitis.  No pulmonary embolism.  Moderate pericardial effusion.  Moderate right and small left pleural effusions.          --- End of Report ---            FERNANDA BRODY MD; Attending Radiologist  This document has been electronically signed. Oct 10 2023  2:34PM

## 2023-10-12 LAB
ANION GAP SERPL CALC-SCNC: 11 MMOL/L — SIGNIFICANT CHANGE UP (ref 5–17)
BASOPHILS # BLD AUTO: 0.05 K/UL — SIGNIFICANT CHANGE UP (ref 0–0.2)
BASOPHILS NFR BLD AUTO: 0.6 % — SIGNIFICANT CHANGE UP (ref 0–2)
BUN SERPL-MCNC: 4 MG/DL — LOW (ref 7–23)
CALCIUM SERPL-MCNC: 8.6 MG/DL — SIGNIFICANT CHANGE UP (ref 8.4–10.5)
CHLORIDE SERPL-SCNC: 105 MMOL/L — SIGNIFICANT CHANGE UP (ref 96–108)
CO2 SERPL-SCNC: 22 MMOL/L — SIGNIFICANT CHANGE UP (ref 22–31)
CREAT SERPL-MCNC: 0.87 MG/DL — SIGNIFICANT CHANGE UP (ref 0.5–1.3)
EGFR: 70 ML/MIN/1.73M2 — SIGNIFICANT CHANGE UP
EOSINOPHIL # BLD AUTO: 0.41 K/UL — SIGNIFICANT CHANGE UP (ref 0–0.5)
EOSINOPHIL NFR BLD AUTO: 5.2 % — SIGNIFICANT CHANGE UP (ref 0–6)
GI PCR PANEL: SIGNIFICANT CHANGE UP
GLUCOSE SERPL-MCNC: 136 MG/DL — HIGH (ref 70–99)
HCT VFR BLD CALC: 33.9 % — LOW (ref 34.5–45)
HGB BLD-MCNC: 11.2 G/DL — LOW (ref 11.5–15.5)
IMM GRANULOCYTES NFR BLD AUTO: 0.4 % — SIGNIFICANT CHANGE UP (ref 0–0.9)
LYMPHOCYTES # BLD AUTO: 0.3 K/UL — LOW (ref 1–3.3)
LYMPHOCYTES # BLD AUTO: 3.8 % — LOW (ref 13–44)
MCHC RBC-ENTMCNC: 28.9 PG — SIGNIFICANT CHANGE UP (ref 27–34)
MCHC RBC-ENTMCNC: 33 GM/DL — SIGNIFICANT CHANGE UP (ref 32–36)
MCV RBC AUTO: 87.4 FL — SIGNIFICANT CHANGE UP (ref 80–100)
MONOCYTES # BLD AUTO: 1.32 K/UL — HIGH (ref 0–0.9)
MONOCYTES NFR BLD AUTO: 16.6 % — HIGH (ref 2–14)
NEUTROPHILS # BLD AUTO: 5.82 K/UL — SIGNIFICANT CHANGE UP (ref 1.8–7.4)
NEUTROPHILS NFR BLD AUTO: 73.4 % — SIGNIFICANT CHANGE UP (ref 43–77)
NRBC # BLD: 0 /100 WBCS — SIGNIFICANT CHANGE UP (ref 0–0)
PLATELET # BLD AUTO: 197 K/UL — SIGNIFICANT CHANGE UP (ref 150–400)
POTASSIUM SERPL-MCNC: 3.7 MMOL/L — SIGNIFICANT CHANGE UP (ref 3.5–5.3)
POTASSIUM SERPL-SCNC: 3.7 MMOL/L — SIGNIFICANT CHANGE UP (ref 3.5–5.3)
RBC # BLD: 3.88 M/UL — SIGNIFICANT CHANGE UP (ref 3.8–5.2)
RBC # FLD: 14.3 % — SIGNIFICANT CHANGE UP (ref 10.3–14.5)
SODIUM SERPL-SCNC: 138 MMOL/L — SIGNIFICANT CHANGE UP (ref 135–145)
WBC # BLD: 7.93 K/UL — SIGNIFICANT CHANGE UP (ref 3.8–10.5)
WBC # FLD AUTO: 7.93 K/UL — SIGNIFICANT CHANGE UP (ref 3.8–10.5)

## 2023-10-12 RX ADMIN — Medication 650 MILLIGRAM(S): at 01:21

## 2023-10-12 RX ADMIN — Medication 100 MICROGRAM(S): at 05:36

## 2023-10-12 RX ADMIN — Medication 600 MILLIGRAM(S): at 08:21

## 2023-10-12 RX ADMIN — SODIUM CHLORIDE 75 MILLILITER(S): 9 INJECTION, SOLUTION INTRAVENOUS at 05:36

## 2023-10-12 RX ADMIN — PIPERACILLIN AND TAZOBACTAM 25 GRAM(S): 4; .5 INJECTION, POWDER, LYOPHILIZED, FOR SOLUTION INTRAVENOUS at 01:22

## 2023-10-12 RX ADMIN — Medication 650 MILLIGRAM(S): at 16:48

## 2023-10-12 RX ADMIN — ALBUTEROL 2.5 MILLIGRAM(S): 90 AEROSOL, METERED ORAL at 07:41

## 2023-10-12 RX ADMIN — MONTELUKAST 10 MILLIGRAM(S): 4 TABLET, CHEWABLE ORAL at 11:14

## 2023-10-12 RX ADMIN — Medication 0.5 MILLIGRAM(S): at 21:17

## 2023-10-12 RX ADMIN — LOSARTAN POTASSIUM 25 MILLIGRAM(S): 100 TABLET, FILM COATED ORAL at 08:21

## 2023-10-12 RX ADMIN — Medication 650 MILLIGRAM(S): at 17:18

## 2023-10-12 RX ADMIN — ALBUTEROL 2.5 MILLIGRAM(S): 90 AEROSOL, METERED ORAL at 20:18

## 2023-10-12 RX ADMIN — Medication 600 MILLIGRAM(S): at 17:03

## 2023-10-12 RX ADMIN — Medication 650 MILLIGRAM(S): at 08:21

## 2023-10-12 RX ADMIN — BUDESONIDE AND FORMOTEROL FUMARATE DIHYDRATE 2 PUFF(S): 160; 4.5 AEROSOL RESPIRATORY (INHALATION) at 05:36

## 2023-10-12 RX ADMIN — PIPERACILLIN AND TAZOBACTAM 25 GRAM(S): 4; .5 INJECTION, POWDER, LYOPHILIZED, FOR SOLUTION INTRAVENOUS at 11:14

## 2023-10-12 NOTE — DIETITIAN INITIAL EVALUATION ADULT - ADD RECOMMEND
1) Pt to remain on clear liquid diet with IVFs at this time, progress to full liquid as medically feasible and advance to low-fiber as tolerated  2) Recommend ensure plus HP BID when applicable (350kcal, 20gm protein per 8 fl oz serving)   3) Monitor po intake, diet tolerance, weight trends, labs, GI function, skin integrity

## 2023-10-12 NOTE — PROGRESS NOTE ADULT - ASSESSMENT
The patient is a 73 year old female with a history of HTN, pericardial effusion, asthma, metastatic throat cancer, radiation pneumonitis who presents with abdominal pain, nausea, diarrhea.    Plan:  - ECG with low voltage  - CTA chest with moderate sized pericardial effusion and bilateral pleural effusions  - The patient has a longstanding history of small pericardial effusion  - Echo with normal LV systolic function, moderate pericardial effusion  - BNP near normal at 282  - Avoid diuretics  - Pleural effusions - pulm follow-up  - Continue losartan 25 mg daily  - Abdominal work-up as indicated

## 2023-10-12 NOTE — DIETITIAN INITIAL EVALUATION ADULT - OTHER INFO
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 Pt reports decreased appetite/intake over the past few months. Pt currently on clear liquid diet. Reports fair intake; consumed vegetable broth and jello this am. No food allergies. No chewing/swallowing difficulties. Denies N/V currently. Diarrhea ongoing. CBW on admission 97#. Pt reports wt loss ongoing over the past 6 months. Reports previous wt of 115#. No edema noted. Skin: R upper chest port. No edema noted. Skin: R upper chest port. No pressure ulcers. Encouraged po intake during visit. Recommend advancing diet as medically feasible.

## 2023-10-12 NOTE — DIETITIAN INITIAL EVALUATION ADULT - ORAL INTAKE PTA/DIET HISTORY
Pt reports decreased appetite/intake over the past 6 months. Typically consumes up to 2 meals/day. Tries to consume boost supplement daily.

## 2023-10-12 NOTE — DIETITIAN INITIAL EVALUATION ADULT - PERTINENT MEDS FT
MEDICATIONS  (STANDING):  acetaminophen     Tablet .. 650 milliGRAM(s) Oral every 6 hours  albuterol    0.083% 2.5 milliGRAM(s) Nebulizer every 6 hours  albuterol/ipratropium for Nebulization 3 milliLiter(s) Nebulizer every 8 hours  ALPRAZolam 0.5 milliGRAM(s) Oral at bedtime  budesonide  80 MICROgram(s)/formoterol 4.5 MICROgram(s) Inhaler 2 Puff(s) Inhalation daily  guaiFENesin  milliGRAM(s) Oral every 12 hours  heparin   Injectable 5000 Unit(s) SubCutaneous every 8 hours  lactated ringers. 1000 milliLiter(s) (75 mL/Hr) IV Continuous <Continuous>  levothyroxine 100 MICROGram(s) Oral daily  losartan 25 milliGRAM(s) Oral daily  montelukast 10 milliGRAM(s) Oral daily  piperacillin/tazobactam IVPB.. 3.375 Gram(s) IV Intermittent every 8 hours    MEDICATIONS  (PRN):  guaiFENesin Oral Liquid (Sugar-Free) 200 milliGRAM(s) Oral every 6 hours PRN Cough  ondansetron Injectable 4 milliGRAM(s) IV Push every 6 hours PRN Nausea and/or Vomiting

## 2023-10-12 NOTE — DIETITIAN INITIAL EVALUATION ADULT - ETIOLOGY
related to inadequate protein-energy intake with increased needs in setting of throat ca with mets to the lung related to alteration in GI tract function

## 2023-10-12 NOTE — PROGRESS NOTE ADULT - ASSESSMENT
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         Pancolitis CT abdomen shows Pancolitis   Start patient on Zosyn   Follo wup C diff Stool cx     GI eval appreciated   GI PCR neg   C diff neg   Advance diet as tolerated     Pericardial Effusion on CT   Patient asymptomatic   cards eval appreciated   ??Echo BNP levels

## 2023-10-12 NOTE — CASE MANAGEMENT PROGRESS NOTE - NSCMPROGRESSNOTE_GEN_ALL_CORE
Update Communication Note: As per morning rounds on [facility] [place],  PCR was negative, Pending C.Difficule result. Not ready for Discharge. Will continue to follow patient.

## 2023-10-12 NOTE — PROGRESS NOTE ADULT - ASSESSMENT
Colitis/Diarrhea of unclear etiology    GI PCR negative, Cdiff panel pending    continue IVF  monitor electrolytes  monitor BMs  diet as tolerated  will follow    Discussed with Dr. Jimenez

## 2023-10-12 NOTE — DIETITIAN INITIAL EVALUATION ADULT - NUTRITION DIAGNOSIS
yes... Mirvaso Counseling: Mirvaso is a topical medication which can decrease superficial blood flow where applied. Side effects are uncommon and include stinging, redness and allergic reactions.

## 2023-10-12 NOTE — DIETITIAN INITIAL EVALUATION ADULT - SIGNS/SYMPTOMS
as evidenced by <75% of EER for > 1 month, >10% wt loss x 6 months, NFPE findings. as evidenced by abdominal pain, N/V, s/p CT scan- pancolitis.

## 2023-10-12 NOTE — PROGRESS NOTE ADULT - ASSESSMENT
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     pericardial eff  pleural eff  HTN  mets ca  head and neck ca  mets to lung  GERD  Asthma    GI PCR neg  Cardio and GI eval noted  vs noted  TTE -     follows with 2 pulm MDs  [place] Ruiz [place] [facility] [place] is s/p Chemo and is on Immuno - Keytruda  NEBS and Singulair and Robitussin PRN  monitor VS and HD  pleural eff noted - no urgency in pleurocentesis - may follow up outpatient for decision - unless becomes symptomatic  Incentive luis  assist with needs  cardio eval  CT reviewed with the patient

## 2023-10-12 NOTE — PROGRESS NOTE ADULT - NUTRITIONAL ASSESSMENT
This patient has been assessed with a concern for Malnutrition and has been determined to have a diagnosis/diagnoses of Severe protein-calorie malnutrition and Underweight (BMI < 19).    This patient is being managed with:   Diet Soft and Bite Sized-  Supplement Feeding Modality:  Oral  Ensure Plus High Protein Cans or Servings Per Day:  1       Frequency:  Two Times a day  Entered: Oct 12 2023  2:40PM    Diet Soft and Bite Sized-  Entered: Oct 12 2023  2:17PM    The following pending diet order is being considered for treatment of Severe protein-calorie malnutrition and Underweight (BMI < 19):null

## 2023-10-13 ENCOUNTER — TRANSCRIPTION ENCOUNTER (OUTPATIENT)
Age: 73
End: 2023-10-13

## 2023-10-13 VITALS — HEART RATE: 107 BPM

## 2023-10-13 LAB
BASOPHILS # BLD AUTO: 0.06 K/UL — SIGNIFICANT CHANGE UP (ref 0–0.2)
BASOPHILS NFR BLD AUTO: 0.7 % — SIGNIFICANT CHANGE UP (ref 0–2)
EOSINOPHIL # BLD AUTO: 0.58 K/UL — HIGH (ref 0–0.5)
EOSINOPHIL NFR BLD AUTO: 7.2 % — HIGH (ref 0–6)
HCT VFR BLD CALC: 34.6 % — SIGNIFICANT CHANGE UP (ref 34.5–45)
HGB BLD-MCNC: 11.4 G/DL — LOW (ref 11.5–15.5)
IMM GRANULOCYTES NFR BLD AUTO: 0.5 % — SIGNIFICANT CHANGE UP (ref 0–0.9)
LYMPHOCYTES # BLD AUTO: 0.35 K/UL — LOW (ref 1–3.3)
LYMPHOCYTES # BLD AUTO: 4.3 % — LOW (ref 13–44)
MCHC RBC-ENTMCNC: 28.9 PG — SIGNIFICANT CHANGE UP (ref 27–34)
MCHC RBC-ENTMCNC: 32.9 GM/DL — SIGNIFICANT CHANGE UP (ref 32–36)
MCV RBC AUTO: 87.6 FL — SIGNIFICANT CHANGE UP (ref 80–100)
MONOCYTES # BLD AUTO: 1.07 K/UL — HIGH (ref 0–0.9)
MONOCYTES NFR BLD AUTO: 13.2 % — SIGNIFICANT CHANGE UP (ref 2–14)
NEUTROPHILS # BLD AUTO: 5.99 K/UL — SIGNIFICANT CHANGE UP (ref 1.8–7.4)
NEUTROPHILS NFR BLD AUTO: 74.1 % — SIGNIFICANT CHANGE UP (ref 43–77)
NRBC # BLD: 0 /100 WBCS — SIGNIFICANT CHANGE UP (ref 0–0)
PLATELET # BLD AUTO: 216 K/UL — SIGNIFICANT CHANGE UP (ref 150–400)
RBC # BLD: 3.95 M/UL — SIGNIFICANT CHANGE UP (ref 3.8–5.2)
RBC # FLD: 14.3 % — SIGNIFICANT CHANGE UP (ref 10.3–14.5)
WBC # BLD: 8.09 K/UL — SIGNIFICANT CHANGE UP (ref 3.8–10.5)
WBC # FLD AUTO: 8.09 K/UL — SIGNIFICANT CHANGE UP (ref 3.8–10.5)

## 2023-10-13 PROCEDURE — 71045 X-RAY EXAM CHEST 1 VIEW: CPT | Mod: 26

## 2023-10-13 RX ORDER — LOSARTAN POTASSIUM 100 MG/1
1 TABLET, FILM COATED ORAL
Qty: 0 | Refills: 0 | DISCHARGE
Start: 2023-10-13

## 2023-10-13 RX ORDER — LOSARTAN POTASSIUM 100 MG/1
1 TABLET, FILM COATED ORAL
Qty: 0 | Refills: 0 | DISCHARGE

## 2023-10-13 RX ORDER — CETIRIZINE HYDROCHLORIDE 10 MG/1
1 TABLET ORAL
Qty: 0 | Refills: 0 | DISCHARGE

## 2023-10-13 RX ORDER — IPRATROPIUM/ALBUTEROL SULFATE 18-103MCG
3 AEROSOL WITH ADAPTER (GRAM) INHALATION EVERY 6 HOURS
Refills: 0 | Status: DISCONTINUED | OUTPATIENT
Start: 2023-10-13 | End: 2023-10-13

## 2023-10-13 RX ORDER — ALPRAZOLAM 0.25 MG
1 TABLET ORAL
Qty: 0 | Refills: 0 | DISCHARGE
Start: 2023-10-13

## 2023-10-13 RX ORDER — MONTELUKAST 4 MG/1
1 TABLET, CHEWABLE ORAL
Qty: 0 | Refills: 0 | DISCHARGE
Start: 2023-10-13

## 2023-10-13 RX ORDER — OMALIZUMAB 150 MG/ML
1 INJECTION, SOLUTION SUBCUTANEOUS
Qty: 0 | Refills: 0 | DISCHARGE

## 2023-10-13 RX ORDER — SODIUM CHLORIDE 9 MG/ML
4 INJECTION INTRAMUSCULAR; INTRAVENOUS; SUBCUTANEOUS EVERY 12 HOURS
Refills: 0 | Status: DISCONTINUED | OUTPATIENT
Start: 2023-10-13 | End: 2023-10-13

## 2023-10-13 RX ORDER — MONTELUKAST 4 MG/1
1 TABLET, CHEWABLE ORAL
Qty: 0 | Refills: 0 | DISCHARGE

## 2023-10-13 RX ORDER — IPRATROPIUM/ALBUTEROL SULFATE 18-103MCG
3 AEROSOL WITH ADAPTER (GRAM) INHALATION
Refills: 0 | Status: DISCONTINUED | OUTPATIENT
Start: 2023-10-13 | End: 2023-10-13

## 2023-10-13 RX ADMIN — BUDESONIDE AND FORMOTEROL FUMARATE DIHYDRATE 2 PUFF(S): 160; 4.5 AEROSOL RESPIRATORY (INHALATION) at 08:38

## 2023-10-13 RX ADMIN — Medication 650 MILLIGRAM(S): at 08:38

## 2023-10-13 RX ADMIN — Medication 3 MILLILITER(S): at 12:45

## 2023-10-13 RX ADMIN — Medication 60 MILLIGRAM(S): at 11:43

## 2023-10-13 RX ADMIN — Medication 100 MICROGRAM(S): at 06:12

## 2023-10-13 RX ADMIN — MONTELUKAST 10 MILLIGRAM(S): 4 TABLET, CHEWABLE ORAL at 11:44

## 2023-10-13 RX ADMIN — Medication 650 MILLIGRAM(S): at 13:43

## 2023-10-13 RX ADMIN — ALBUTEROL 2.5 MILLIGRAM(S): 90 AEROSOL, METERED ORAL at 07:29

## 2023-10-13 RX ADMIN — LOSARTAN POTASSIUM 25 MILLIGRAM(S): 100 TABLET, FILM COATED ORAL at 08:38

## 2023-10-13 RX ADMIN — Medication 600 MILLIGRAM(S): at 08:38

## 2023-10-13 NOTE — PROGRESS NOTE ADULT - ASSESSMENT
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     pericardial eff  pleural eff  HTN  mets ca  head and neck ca  mets to lung  GERD  Asthma    GI PCR neg  Cardio and GI eval noted  vs noted  TTE - no tamponade  on zosyn  on IVF    follows with 2 pulm MDs  Dr Ruiz in [place] and pulm MD in [facility] [place]  pt is s/p Chemo and is on Immuno - Keytruda  NEBS and Singulair and Robitussin PRN  monitor VS and HD  pleural eff noted - no urgency in pleurocentesis - may follow up outpatient for decision - unless becomes symptomatic  Incentive luis  assist with needs  cardio eval  CT reviewed with the patient 73 F with pmhx  Samter's triad, hypertension, 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     pericardial eff  pleural eff  HTN  mets ca  head and neck ca  mets to lung  GERD  Asthma    GI PCR neg  Cardio and GI eval noted  vs noted  TTE - no tamponade  on zosyn  nebs - symbicort - solumedrol - cxr - cough rx regimen and mucolytics  monitor for vol overload - s/p IVF - may need PRN lasix with caution    follows with 2 pulm MDs  Dr Ruiz in [place] and [place] in [facility] [place]  pt is s/p Chemo and is on Immuno - Keytruda  NEBS and Singulair and Robitussin PRN  monitor VS and HD  pleural eff noted - no urgency in pleurocentesis - may follow up outpatient for decision - unless becomes symptomatic  Incentive luis  assist with needs  cardio eval  CT reviewed with the patient

## 2023-10-13 NOTE — PROGRESS NOTE ADULT - SUBJECTIVE AND OBJECTIVE BOX
Chief Complaint: Abdominal pain, nausea    Interval Events: No events overnight.    Review of Systems:  General: No fevers, chills, weight gain  Skin: No rashes, color changes  Cardiovascular: No chest pain, orthopnea  Respiratory: No shortness of breath, cough  Gastrointestinal: No nausea, abdominal pain  Genitourinary: No incontinence, pain with urination  Musculoskeletal: No pain, swelling, decreased range of motion  Neurological: No headache, weakness  Psychiatric: No depression, anxiety  Endocrine: No weight gain, increased thirst  All other systems are comprehensively negative.    Physical Exam:  Vitals:        Vital Signs Last 24 Hrs  T(C): 37 (12 Oct 2023 05:19), Max: 37.4 (11 Oct 2023 20:53)  T(F): 98.6 (12 Oct 2023 05:19), Max: 99.3 (11 Oct 2023 20:53)  HR: 108 (12 Oct 2023 05:19) (94 - 112)  BP: 108/59 (12 Oct 2023 05:19) (108/59 - 136/73)  BP(mean): --  RR: 18 (12 Oct 2023 05:19) (18 - 18)  SpO2: 97% (12 Oct 2023 05:19) (93% - 100%)  Parameters below as of 12 Oct 2023 05:19  Patient On (Oxygen Delivery Method): room air  General: NAD  HEENT: MMM  Neck: No JVD, no carotid bruit  Lungs: CTAB  CV: RRR, nl S1/S2, no M/R/G  Abdomen: S/NT/ND, +BS  Extremities: No LE edema, no cyanosis  Neuro: AAOx3, non-focal  Skin: No rash    Labs:                        12.6   10.18 )-----------( 218      ( 10 Oct 2023 12:32 )             37.4     10-10    134<L>  |  97  |  8   ----------------------------<  105<H>  3.8   |  24  |  0.94    Ca    9.4      10 Oct 2023 12:32    TPro  6.7  /  Alb  3.3  /  TBili  0.5  /  DBili  x   /  AST  29  /  ALT  20  /  AlkPhos  64  10-10        PT/INR - ( 10 Oct 2023 12:32 )   PT: 11.1 sec;   INR: 0.99 ratio         PTT - ( 10 Oct 2023 12:32 )  PTT:27.5 sec    ECG/Telemetry: Sinus rhythm
Date/Time Patient Seen:  		  Referring MD:   Data Reviewed	       Patient is a 73y old  Female who presents with a chief complaint of Abdominal Pain (11 Oct 2023 22:19)      Subjective/HPI     PAST MEDICAL & SURGICAL HISTORY:  Samter's triad    Hypertension    Osteoarthritis    Asthmatic bronchitis  well-controlled, no recent exacerbations    GERD (gastroesophageal reflux disease)    Head and neck cancer  tonsil - diagnosed 1/2020 - s/p 35 radiation treatments and 3 cycles of chemotherapy, followed by Dr Tovar ([facility])    Pericardial effusion  longstanding h/o small pericardial effusion - last Echo 2017 revealed trace pericardial effusion    Cervicalgia    Carpal tunnel syndrome  left    COVID-19  Pt had Covid-19 3/2020, did not require hospitalization    H/O hand surgery  right hand - joints fused    History of bunionectomy  bilateral    History of tibial fracture  1996 right - s/p ORIF    H/O sinus surgery  2018    S/P arthroscopy of knee  left    H/O cataract extraction  2/2019 bilateral          Medication list         MEDICATIONS  (STANDING):  acetaminophen     Tablet .. 650 milliGRAM(s) Oral every 6 hours  albuterol    0.083% 2.5 milliGRAM(s) Nebulizer every 6 hours  albuterol/ipratropium for Nebulization 3 milliLiter(s) Nebulizer every 8 hours  ALPRAZolam 0.5 milliGRAM(s) Oral at bedtime  budesonide  80 MICROgram(s)/formoterol 4.5 MICROgram(s) Inhaler 2 Puff(s) Inhalation daily  guaiFENesin  milliGRAM(s) Oral every 12 hours  heparin   Injectable 5000 Unit(s) SubCutaneous every 8 hours  lactated ringers. 1000 milliLiter(s) (75 mL/Hr) IV Continuous <Continuous>  levothyroxine 100 MICROGram(s) Oral daily  losartan 25 milliGRAM(s) Oral daily  montelukast 10 milliGRAM(s) Oral daily  piperacillin/tazobactam IVPB.. 3.375 Gram(s) IV Intermittent every 8 hours    MEDICATIONS  (PRN):  guaiFENesin Oral Liquid (Sugar-Free) 200 milliGRAM(s) Oral every 6 hours PRN Cough  ondansetron Injectable 4 milliGRAM(s) IV Push every 6 hours PRN Nausea and/or Vomiting         Vitals log        ICU Vital Signs Last 24 Hrs  T(C): 37 (12 Oct 2023 05:19), Max: 37.4 (11 Oct 2023 20:53)  T(F): 98.6 (12 Oct 2023 05:19), Max: 99.3 (11 Oct 2023 20:53)  HR: 108 (12 Oct 2023 05:19) (91 - 112)  BP: 108/59 (12 Oct 2023 05:19) (108/59 - 136/73)  BP(mean): --  ABP: --  ABP(mean): --  RR: 18 (12 Oct 2023 05:19) (17 - 18)  SpO2: 87% (12 Oct 2023 05:19) (87% - 100%)    O2 Parameters below as of 12 Oct 2023 05:19  Patient On (Oxygen Delivery Method): room air                 Input and Output:  I&O's Detail      Lab Data                        12.6   10.18 )-----------( 218      ( 10 Oct 2023 12:32 )             37.4     10-10    134<L>  |  97  |  8   ----------------------------<  105<H>  3.8   |  24  |  0.94    Ca    9.4      10 Oct 2023 12:32    TPro  6.7  /  Alb  3.3  /  TBili  0.5  /  DBili  x   /  AST  29  /  ALT  20  /  AlkPhos  64  10-10            Review of Systems	      Objective     Physical Examination    heart s1s2  lung dc BS      Pertinent Lab findings & Imaging      Chandni:  NO   Adequate UO     I&O's Detail           Discussed with:     Cultures:	        Radiology                            
Chief Complaint: Abdominal pain, nausea    Interval Events: No events overnight.    Review of Systems:  General: No fevers, chills, weight gain  Skin: No rashes, color changes  Cardiovascular: No chest pain, orthopnea  Respiratory: No shortness of breath, cough  Gastrointestinal: No nausea, abdominal pain  Genitourinary: No incontinence, pain with urination  Musculoskeletal: No pain, swelling, decreased range of motion  Neurological: No headache, weakness  Psychiatric: No depression, anxiety  Endocrine: No weight gain, increased thirst  All other systems are comprehensively negative.    Physical Exam:  Vital Signs Last 24 Hrs  T(C): 36.9 (13 Oct 2023 07:27), Max: 37.1 (12 Oct 2023 13:47)  T(F): 98.4 (13 Oct 2023 07:27), Max: 98.8 (12 Oct 2023 13:47)  HR: 78 (13 Oct 2023 07:38) (78 - 115)  BP: 128/86 (13 Oct 2023 07:27) (122/78 - 137/75)  BP(mean): 100 (13 Oct 2023 07:27) (100 - 100)  RR: 18 (13 Oct 2023 07:27) (16 - 18)  SpO2: 98% (13 Oct 2023 07:38) (92% - 98%)  Parameters below as of 13 Oct 2023 07:38  Patient On (Oxygen Delivery Method): room air  General: NAD  HEENT: MMM  Neck: No JVD, no carotid bruit  Lungs: CTAB  CV: RRR, nl S1/S2, no M/R/G  Abdomen: S/NT/ND, +BS  Extremities: No LE edema, no cyanosis  Neuro: AAOx3, non-focal  Skin: No rash    Labs:             10-12    138  |  105  |  4<L>  ----------------------------<  136<H>  3.7   |  22  |  0.87    Ca    8.6      12 Oct 2023 16:06                          11.4   8.09  )-----------( 216      ( 13 Oct 2023 07:38 )             34.6       ECG/Telemetry: Sinus rhythm
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     Patient reports 1 week ago she got her infusion of Keytruda.  Patient reports the same time she did a colon cleanse.  Patient reports on Tuesday she started Ceftin for a cough that she has had for months.  Patient reports that today she started having diarrhea with nausea without vomiting.  Patient reports she was seen at urgent care on Saturday 10 7 who advised her to do a culture for C. difficile.  Patient reports at that time she started having nausea and 1 episode of vomiting last night.  Patient consulted her GI today Dr. Zapata who referred her to ED for evaluation.  Patient reports diffuse abdominal pain.  Patient took Tylenol yesterday which provided minimal relief. pt also notes sob that she has had since august. pt reports she currently took albuterol and ceftin for symptoms.  Patient denies fever, chest pain, dysuria (10 Oct 2023 17:33)      PAST MEDICAL & SURGICAL HISTORY:  Samter's triad      Hypertension      Osteoarthritis      Asthmatic bronchitis  well-controlled, no recent exacerbations      GERD (gastroesophageal reflux disease)      Head and neck cancer  tonsil - diagnosed 2020 - s/p 35 radiation treatments and 3 cycles of chemotherapy, followed by Dr Tovar ([facility])      Pericardial effusion  longstanding h/o small pericardial effusion - last Echo  revealed trace pericardial effusion      Cervicalgia      Carpal tunnel syndrome  left      COVID-19  Pt had Covid-19 3/2020, did not require hospitalization      H/O hand surgery  right hand - joints fused      History of bunionectomy  bilateral      History of tibial fracture  1996 right - s/p ORIF      H/O sinus surgery  2018      S/P arthroscopy of knee  left      H/O cataract extraction  2019 bilateral          Review of Systems:   CONSTITUTIONAL: No fever, weight loss, or fatigue  EYES: No eye pain, visual disturbances, or discharge  ENMT:  No difficulty hearing, tinnitus, vertigo; No sinus or throat pain  NECK: No pain or stiffness  BREASTS: No pain, masses, or nipple discharge  RESPIRATORY: No cough, wheezing, chills or hemoptysis; No shortness of breath  CARDIOVASCULAR: No chest pain, palpitations, dizziness, or leg swelling  GASTROINTESTINAL: No abdominal or epigastric pain. No nausea, vomiting, or hematemesis; No diarrhea or constipation. No melena or hematochezia.  GENITOURINARY: No dysuria, frequency, hematuria, or incontinence  NEUROLOGICAL: No headaches, memory loss, loss of strength, numbness, or tremors  SKIN: No itching, burning, rashes, or lesions   LYMPH NODES: No enlarged glands  ENDOCRINE: No heat or cold intolerance; No hair loss  MUSCULOSKELETAL: No joint pain or swelling; No muscle, back, or extremity pain  PSYCHIATRIC: No depression, anxiety, mood swings, or difficulty sleeping  HEME/LYMPH: No easy bruising, or bleeding gums  ALLERY AND IMMUNOLOGIC: No hives or eczema    Allergies    aspirin (Short breath)  NSAIDs (Short breath)    Intolerances        Social History:     FAMILY HISTORY:      MEDICATIONS  (STANDING):  acetaminophen     Tablet .. 650 milliGRAM(s) Oral every 6 hours  albuterol    0.083% 2.5 milliGRAM(s) Nebulizer every 6 hours  albuterol/ipratropium for Nebulization 3 milliLiter(s) Nebulizer every 8 hours  ALPRAZolam 0.5 milliGRAM(s) Oral at bedtime  budesonide  80 MICROgram(s)/formoterol 4.5 MICROgram(s) Inhaler 2 Puff(s) Inhalation daily  guaiFENesin  milliGRAM(s) Oral every 12 hours  heparin   Injectable 5000 Unit(s) SubCutaneous every 8 hours  lactated ringers. 1000 milliLiter(s) (75 mL/Hr) IV Continuous <Continuous>  levothyroxine 100 MICROGram(s) Oral daily  losartan 25 milliGRAM(s) Oral daily  montelukast 10 milliGRAM(s) Oral daily  piperacillin/tazobactam IVPB.. 3.375 Gram(s) IV Intermittent every 8 hours    MEDICATIONS  (PRN):  guaiFENesin Oral Liquid (Sugar-Free) 200 milliGRAM(s) Oral every 6 hours PRN Cough  ondansetron Injectable 4 milliGRAM(s) IV Push every 6 hours PRN Nausea and/or Vomiting        CAPILLARY BLOOD GLUCOSE        I&O's Summary      PHYSICAL EXAM:  GENERAL: NAD, well-developed  HEAD:  Atraumatic, Normocephalic  EYES: EOMI, PERRLA, conjunctiva and sclera clear  NECK: Supple, No JVD  CHEST/LUNG: Clear to auscultation bilaterally; No wheeze  HEART: Regular rate and rhythm; No murmurs, rubs, or gallops  ABDOMEN: Soft, Nontender, Nondistended; Bowel sounds present  EXTREMITIES:  2+ Peripheral Pulses, No clubbing, cyanosis, or edema  PSYCH: AAOx3  NEUROLOGY: non-focal  SKIN: No rashes or lesions    LABS:                        12.6   10.18 )-----------( 218      ( 10 Oct 2023 12:32 )             37.4     10-10    134<L>  |  97  |  8   ----------------------------<  105<H>  3.8   |  24  |  0.94    Ca    9.4      10 Oct 2023 12:32    TPro  6.7  /  Alb  3.3  /  TBili  0.5  /  DBili  x   /  AST  29  /  ALT  20  /  AlkPhos  64  10-10    PT/INR - ( 10 Oct 2023 12:32 )   PT: 11.1 sec;   INR: 0.99 ratio         PTT - ( 10 Oct 2023 12:32 )  PTT:27.5 sec      Urinalysis Basic - ( 10 Oct 2023 14:24 )    Color: Yellow / Appearance: Clear / S.035 / pH: x  Gluc: x / Ketone: Trace mg/dL  / Bili: Negative / Urobili: 0.2 mg/dL   Blood: x / Protein: Negative mg/dL / Nitrite: Negative   Leuk Esterase: Negative / RBC: 1 /HPF / WBC 2 /HPF   Sq Epi: x / Non Sq Epi: x / Bacteria: Negative /HPF        RADIOLOGY & ADDITIONAL TESTS:    Imaging Personally Reviewed:    Consultant(s) Notes Reviewed:      Care Discussed with Consultants/Other Providers:  
INTERVAL HPI/OVERNIGHT EVENTS:  HPI:  No new overnight event.  No N/V. Diarrhea improving.  Tolerating diet.     Allergies    aspirin (Short breath)  NSAIDs (Short breath)    Intolerances    General:  No wt loss, fevers, chills, night sweats, fatigue,   Eyes:  Good vision, no reported pain  ENT:  No sore throat, pain, runny nose, dysphagia  CV:  No pain, palpitations, hypo/hypertension  Resp:  No dyspnea, cough, tachypnea, wheezing  GI:  see HPI  :  No pain, bleeding, incontinence, nocturia  Muscle:  No pain, weakness  Neuro:  No weakness, tingling, memory problems  Psych:  No fatigue, insomnia, mood problems, depression  Endocrine:  No polyuria, polydipsia, cold/heat intolerance  Heme:  No petechiae, ecchymosis, easy bruisability  Skin:  No rash, tattoos, scars, edema      PHYSICAL EXAM:   Vital Signs:  Vital Signs Last 24 Hrs  T(C): 36.9 (13 Oct 2023 07:27), Max: 37.1 (12 Oct 2023 13:47)  T(F): 98.4 (13 Oct 2023 07:27), Max: 98.8 (12 Oct 2023 13:47)  HR: 78 (13 Oct 2023 07:38) (78 - 115)  BP: 128/86 (13 Oct 2023 07:27) (122/78 - 137/75)  BP(mean): 100 (13 Oct 2023 07:27) (100 - 100)  RR: 18 (13 Oct 2023 07:27) (16 - 18)  SpO2: 98% (13 Oct 2023 07:38) (92% - 98%)    Parameters below as of 13 Oct 2023 07:38  Patient On (Oxygen Delivery Method): room air      Daily     Daily I&O's Summary      GENERAL:  Appears stated age, well-groomed, well-nourished, no distress  HEENT:  NC/AT,  conjunctivae clear and pink, no thyromegaly, nodules, adenopathy, no JVD, sclera -anicteric  CHEST:  Full & symmetric excursion, no increased effort, breath sounds clear  HEART:  Regular rhythm, S1, S2, no murmur/rub/S3/S4, no abdominal bruit, no edema  ABDOMEN:  Soft, non-tender, non-distended, normoactive bowel sounds,  no masses ,no hepato-splenomegaly, no signs of chronic liver disease  EXTEREMITIES:  no cyanosis,clubbing or edema  SKIN:  No rash/erythema/ecchymoses/petechiae/wounds/abscess/warm/dry  NEURO:  Alert, oriented, no asterixis, no tremor, no encephalopathy      LABS:                        11.4   8.09  )-----------( 216      ( 13 Oct 2023 07:38 )             34.6     10-12    138  |  105  |  4<L>  ----------------------------<  136<H>  3.7   |  22  |  0.87    Ca    8.6      12 Oct 2023 16:06        Urinalysis Basic - ( 12 Oct 2023 16:06 )    Color: x / Appearance: x / SG: x / pH: x  Gluc: 136 mg/dL / Ketone: x  / Bili: x / Urobili: x   Blood: x / Protein: x / Nitrite: x   Leuk Esterase: x / RBC: x / WBC x   Sq Epi: x / Non Sq Epi: x / Bacteria: x      amylase   lipaseLipase: 14 U/L (10-10 @ 12:32)    RADIOLOGY & ADDITIONAL TESTS:  
Date/Time Patient Seen:  		  Referring MD:   Data Reviewed	       Patient is a 73y old  Female who presents with a chief complaint of Abdominal Pain (12 Oct 2023 16:55)      Subjective/HPI     PAST MEDICAL & SURGICAL HISTORY:  Samter's triad    Hypertension    Osteoarthritis    Asthmatic bronchitis  well-controlled, no recent exacerbations    GERD (gastroesophageal reflux disease)    Head and neck cancer  tonsil - diagnosed 1/2020 - s/p 35 radiation treatments and 3 cycles of chemotherapy, followed by Dr Tovar ([facility])    Pericardial effusion  longstanding h/o small pericardial effusion - last Echo 2017 revealed trace pericardial effusion    Cervicalgia    Carpal tunnel syndrome  left    COVID-19  Pt had Covid-19 3/2020, did not require hospitalization    H/O hand surgery  right hand - joints fused    History of bunionectomy  bilateral    History of tibial fracture  1996 right - s/p ORIF    H/O sinus surgery  2018    S/P arthroscopy of knee  left    H/O cataract extraction  2/2019 bilateral          Medication list         MEDICATIONS  (STANDING):  acetaminophen     Tablet .. 650 milliGRAM(s) Oral every 6 hours  albuterol    0.083% 2.5 milliGRAM(s) Nebulizer every 6 hours  albuterol/ipratropium for Nebulization 3 milliLiter(s) Nebulizer every 8 hours  ALPRAZolam 0.5 milliGRAM(s) Oral at bedtime  budesonide  80 MICROgram(s)/formoterol 4.5 MICROgram(s) Inhaler 2 Puff(s) Inhalation daily  guaiFENesin  milliGRAM(s) Oral every 12 hours  heparin   Injectable 5000 Unit(s) SubCutaneous every 8 hours  lactated ringers. 1000 milliLiter(s) (75 mL/Hr) IV Continuous <Continuous>  levothyroxine 100 MICROGram(s) Oral daily  losartan 25 milliGRAM(s) Oral daily  montelukast 10 milliGRAM(s) Oral daily  piperacillin/tazobactam IVPB.. 3.375 Gram(s) IV Intermittent every 8 hours    MEDICATIONS  (PRN):  guaiFENesin Oral Liquid (Sugar-Free) 200 milliGRAM(s) Oral every 6 hours PRN Cough  ondansetron Injectable 4 milliGRAM(s) IV Push every 6 hours PRN Nausea and/or Vomiting         Vitals log        ICU Vital Signs Last 24 Hrs  T(C): 37.1 (13 Oct 2023 05:32), Max: 37.1 (12 Oct 2023 13:47)  T(F): 98.7 (13 Oct 2023 05:32), Max: 98.8 (12 Oct 2023 13:47)  HR: 108 (13 Oct 2023 05:32) (98 - 115)  BP: 122/78 (13 Oct 2023 05:32) (116/60 - 137/75)  BP(mean): 79 (12 Oct 2023 08:19) (79 - 79)  ABP: --  ABP(mean): --  RR: 17 (13 Oct 2023 05:32) (16 - 18)  SpO2: 92% (13 Oct 2023 05:32) (90% - 98%)    O2 Parameters below as of 13 Oct 2023 05:32  Patient On (Oxygen Delivery Method): room air                 Input and Output:  I&O's Detail    11 Oct 2023 07:01  -  12 Oct 2023 07:00  --------------------------------------------------------  IN:    IV PiggyBack: 100 mL    Lactated Ringers: 900 mL  Total IN: 1000 mL    OUT:  Total OUT: 0 mL    Total NET: 1000 mL          Lab Data                        11.2   7.93  )-----------( 197      ( 12 Oct 2023 16:06 )             33.9     10-12    138  |  105  |  4<L>  ----------------------------<  136<H>  3.7   |  22  |  0.87    Ca    8.6      12 Oct 2023 16:06              Review of Systems	      Objective     Physical Examination    heart s1s2  lung dc BS  head nc      Pertinent Lab findings & Imaging      Chandni:  NO   Adequate UO     I&O's Detail    11 Oct 2023 07:01  -  12 Oct 2023 07:00  --------------------------------------------------------  IN:    IV PiggyBack: 100 mL    Lactated Ringers: 900 mL  Total IN: 1000 mL    OUT:  Total OUT: 0 mL    Total NET: 1000 mL               Discussed with:     Cultures:	        Radiology                            
INTERVAL HPI/OVERNIGHT EVENTS:  HPI:  No new overnight event.  No N/V. Reports 4 episodes of loose watery brown diarrhea since yesterday.  Tolerating clear liquid diet.     Allergies    aspirin (Short breath)  NSAIDs (Short breath)    Intolerances    General:  No wt loss, fevers, chills, night sweats, fatigue,   Eyes:  Good vision, no reported pain  ENT:  No sore throat, pain, runny nose, dysphagia  CV:  No pain, palpitations, hypo/hypertension  Resp:  No dyspnea, cough, tachypnea, wheezing  GI: see HPI  :  No pain, bleeding, incontinence, nocturia  Muscle:  No pain, weakness  Neuro:  No weakness, tingling, memory problems  Psych:  No fatigue, insomnia, mood problems, depression  Endocrine:  No polyuria, polydipsia, cold/heat intolerance  Heme:  No petechiae, ecchymosis, easy bruisability  Skin:  No rash, tattoos, scars, edema    PHYSICAL EXAM:   Vital Signs:  Vital Signs Last 24 Hrs  T(C): 36.5 (12 Oct 2023 08:19), Max: 37.4 (11 Oct 2023 20:53)  T(F): 97.7 (12 Oct 2023 08:19), Max: 99.3 (11 Oct 2023 20:53)  HR: 110 (12 Oct 2023 08:19) (94 - 112)  BP: 116/60 (12 Oct 2023 08:19) (108/59 - 136/73)  BP(mean): 79 (12 Oct 2023 08:19) (79 - 79)  RR: 18 (12 Oct 2023 08:19) (18 - 18)  SpO2: 90% (12 Oct 2023 08:19) (90% - 100%)    Parameters below as of 12 Oct 2023 07:41  Patient On (Oxygen Delivery Method): room air      Daily     Daily Weight in k.1 (11 Oct 2023 15:02)I&O's Summary    11 Oct 2023 07:01  -  12 Oct 2023 07:00  --------------------------------------------------------  IN: 1000 mL / OUT: 0 mL / NET: 1000 mL        GENERAL:  Appears stated age, well-groomed, well-nourished, no distress  HEENT:  NC/AT,  conjunctivae clear and pink, no thyromegaly, nodules, adenopathy, no JVD, sclera -anicteric  CHEST:  Full & symmetric excursion, no increased effort, breath sounds clear  HEART:  Regular rhythm, S1, S2, no murmur/rub/S3/S4, no abdominal bruit, no edema  ABDOMEN:  Soft, non-tender, non-distended, normoactive bowel sounds,  no masses ,no hepato-splenomegaly, no signs of chronic liver disease  EXTEREMITIES:  no cyanosis,clubbing or edema  SKIN:  No rash/erythema/ecchymoses/petechiae/wounds/abscess/warm/dry  NEURO:  Alert, oriented, no asterixis, no tremor, no encephalopathy      LABS:                        12.6   10.18 )-----------( 218      ( 10 Oct 2023 12:32 )             37.4     10-10    134<L>  |  97  |  8   ----------------------------<  105<H>  3.8   |  24  |  0.94    Ca    9.4      10 Oct 2023 12:32    TPro  6.7  /  Alb  3.3  /  TBili  0.5  /  DBili  x   /  AST  29  /  ALT  20  /  AlkPhos  64  10-10    PT/INR - ( 10 Oct 2023 12:32 )   PT: 11.1 sec;   INR: 0.99 ratio         PTT - ( 10 Oct 2023 12:32 )  PTT:27.5 sec  Urinalysis Basic - ( 10 Oct 2023 14:24 )    Color: Yellow / Appearance: Clear / S.035 / pH: x  Gluc: x / Ketone: Trace mg/dL  / Bili: Negative / Urobili: 0.2 mg/dL   Blood: x / Protein: Negative mg/dL / Nitrite: Negative   Leuk Esterase: Negative / RBC: 1 /HPF / WBC 2 /HPF   Sq Epi: x / Non Sq Epi: x / Bacteria: Negative /HPF      amylase   lipaseLipase: 14 U/L (10-10 @ 12:32)    RADIOLOGY & ADDITIONAL TESTS:  
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     Patient reports 1 week ago she got her infusion of Keytruda.  Patient reports the same time she did a colon cleanse.  Patient reports on Tuesday she started Ceftin for a cough that she has had for months.  Patient reports that today she started having diarrhea with nausea without vomiting.  Patient reports she was seen at urgent care on Saturday 10 7 who advised her to do a culture for C. difficile.  Patient reports at that time she started having nausea and 1 episode of vomiting last night.  Patient consulted her GI today Dr. Zapata who referred her to ED for evaluation.  Patient reports diffuse abdominal pain.  Patient took Tylenol yesterday which provided minimal relief. pt also notes sob that she has had since august. pt reports she currently took albuterol and ceftin for symptoms.  Patient denies fever, chest pain, dysuria (10 Oct 2023 17:33)      PAST MEDICAL & SURGICAL HISTORY:  Samter's triad      Hypertension      Osteoarthritis      Asthmatic bronchitis  well-controlled, no recent exacerbations      GERD (gastroesophageal reflux disease)      Head and neck cancer  tonsil - diagnosed 2020 - s/p 35 radiation treatments and 3 cycles of chemotherapy, followed by Dr Tovar ([facility])      Pericardial effusion  longstanding h/o small pericardial effusion - last Echo  revealed trace pericardial effusion      Cervicalgia      Carpal tunnel syndrome  left      COVID-19  Pt had Covid-19 3/2020, did not require hospitalization      H/O hand surgery  right hand - joints fused      History of bunionectomy  bilateral      History of tibial fracture  1996 right - s/p ORIF      H/O sinus surgery  2018      S/P arthroscopy of knee  left      H/O cataract extraction  2019 bilateral          Review of Systems:   CONSTITUTIONAL: No fever, weight loss, or fatigue  EYES: No eye pain, visual disturbances, or discharge  ENMT:  No difficulty hearing, tinnitus, vertigo; No sinus or throat pain  NECK: No pain or stiffness  BREASTS: No pain, masses, or nipple discharge  RESPIRATORY: No cough, wheezing, chills or hemoptysis; No shortness of breath  CARDIOVASCULAR: No chest pain, palpitations, dizziness, or leg swelling  GASTROINTESTINAL: No abdominal or epigastric pain. No nausea, vomiting, or hematemesis; No diarrhea or constipation. No melena or hematochezia.  GENITOURINARY: No dysuria, frequency, hematuria, or incontinence  NEUROLOGICAL: No headaches, memory loss, loss of strength, numbness, or tremors  SKIN: No itching, burning, rashes, or lesions   LYMPH NODES: No enlarged glands  ENDOCRINE: No heat or cold intolerance; No hair loss  MUSCULOSKELETAL: No joint pain or swelling; No muscle, back, or extremity pain  PSYCHIATRIC: No depression, anxiety, mood swings, or difficulty sleeping  HEME/LYMPH: No easy bruising, or bleeding gums  ALLERY AND IMMUNOLOGIC: No hives or eczema    Allergies    aspirin (Short breath)  NSAIDs (Short breath)    Intolerances        Social History:     FAMILY HISTORY:    MEDICATIONS  (STANDING):  acetaminophen     Tablet .. 650 milliGRAM(s) Oral every 6 hours  albuterol    0.083% 2.5 milliGRAM(s) Nebulizer every 6 hours  albuterol/ipratropium for Nebulization 3 milliLiter(s) Nebulizer every 8 hours  ALPRAZolam 0.5 milliGRAM(s) Oral at bedtime  budesonide  80 MICROgram(s)/formoterol 4.5 MICROgram(s) Inhaler 2 Puff(s) Inhalation daily  guaiFENesin  milliGRAM(s) Oral every 12 hours  heparin   Injectable 5000 Unit(s) SubCutaneous every 8 hours  lactated ringers. 1000 milliLiter(s) (75 mL/Hr) IV Continuous <Continuous>  levothyroxine 100 MICROGram(s) Oral daily  losartan 25 milliGRAM(s) Oral daily  montelukast 10 milliGRAM(s) Oral daily  piperacillin/tazobactam IVPB.. 3.375 Gram(s) IV Intermittent every 8 hours    MEDICATIONS  (PRN):  guaiFENesin Oral Liquid (Sugar-Free) 200 milliGRAM(s) Oral every 6 hours PRN Cough  ondansetron Injectable 4 milliGRAM(s) IV Push every 6 hours PRN Nausea and/or Vomiting      CAPILLARY BLOOD GLUCOSE        I&O's Summary      PHYSICAL EXAM:  GENERAL: NAD, well-developed  HEAD:  Atraumatic, Normocephalic  EYES: EOMI, PERRLA, conjunctiva and sclera clear  NECK: Supple, No JVD  CHEST/LUNG: Clear to auscultation bilaterally; No wheeze  HEART: Regular rate and rhythm; No murmurs, rubs, or gallops  ABDOMEN: Soft, Nontender, Nondistended; Bowel sounds present  EXTREMITIES:  2+ Peripheral Pulses, No clubbing, cyanosis, or edema  PSYCH: AAOx3  NEUROLOGY: non-focal  SKIN: No rashes or lesions    LABS:                        12.6   10.18 )-----------( 218      ( 10 Oct 2023 12:32 )             37.4     10-10    134<L>  |  97  |  8   ----------------------------<  105<H>  3.8   |  24  |  0.94    Ca    9.4      10 Oct 2023 12:32    TPro  6.7  /  Alb  3.3  /  TBili  0.5  /  DBili  x   /  AST  29  /  ALT  20  /  AlkPhos  64  10-10    PT/INR - ( 10 Oct 2023 12:32 )   PT: 11.1 sec;   INR: 0.99 ratio         PTT - ( 10 Oct 2023 12:32 )  PTT:27.5 sec      Urinalysis Basic - ( 10 Oct 2023 14:24 )    Color: Yellow / Appearance: Clear / S.035 / pH: x  Gluc: x / Ketone: Trace mg/dL  / Bili: Negative / Urobili: 0.2 mg/dL   Blood: x / Protein: Negative mg/dL / Nitrite: Negative   Leuk Esterase: Negative / RBC: 1 /HPF / WBC 2 /HPF   Sq Epi: x / Non Sq Epi: x / Bacteria: Negative /HPF        RADIOLOGY & ADDITIONAL TESTS:    Imaging Personally Reviewed:    Consultant(s) Notes Reviewed:      Care Discussed with Consultants/Other Providers:

## 2023-10-13 NOTE — CASE MANAGEMENT PROGRESS NOTE - NSCMPROGRESSNOTE_GEN_ALL_CORE
RN/CM noted to be very alert, self-directing, ambulating independently in hallway, no skilled DC needs identified. Pt aware of DC home today 10/13/2023, IMM reviewed and she is in agreement with DC. Pt has arranged her own pick-up with her friend. RN on unit is providing pt with DC instructions.

## 2023-10-13 NOTE — PROGRESS NOTE ADULT - PROVIDER SPECIALTY LIST ADULT
Cardiology
Gastroenterology
Hospitalist
Pulmonology
Cardiology
Gastroenterology
Pulmonology
Hospitalist

## 2023-10-13 NOTE — PROGRESS NOTE ADULT - ASSESSMENT
The patient is a 73 year old female with a history of HTN, pericardial effusion, asthma, metastatic throat cancer, radiation pneumonitis who presents with abdominal pain, nausea, diarrhea.    Plan:  - ECG with low voltage  - CTA chest with moderate sized pericardial effusion and bilateral pleural effusions  - The patient has a longstanding history of small pericardial effusion  - Echo with normal LV systolic function, moderate pericardial effusion  - BNP near normal at 282  - Avoid diuretics unless pulm edema develops  - Hold IV fluids  - Pleural effusions/congestion - pulm follow-up  - Continue losartan 25 mg daily

## 2023-10-13 NOTE — DISCHARGE NOTE PROVIDER - NSDCMRMEDTOKEN_GEN_ALL_CORE_FT
albuterol 2.5 mg/3 mL (0.083%) inhalation solution: 3 milliliter(s) inhaled every 6 hours, As Needed  ALPRAZolam 0.5 mg oral tablet: 1 tab(s) orally once a day (at bedtime)  Fosamax 70 mg oral tablet: 1 tab(s) orally once a week - Saturdays  guaiFENesin 600 mg oral tablet, extended release: 1 tab(s) orally every 12 hours  losartan 25 mg oral tablet: 1 tab(s) orally once a day  montelukast 10 mg oral tablet: 1 tab(s) orally once a day  omeprazole 40 mg oral delayed release capsule: 1 cap(s) orally once a day  Spiriva 18 mcg inhalation capsule: 1 cap(s) inhaled once a day  Symbicort 80 mcg-4.5 mcg/inh inhalation aerosol: 2 puff(s) inhaled once a day  Synthroid 100 mcg (0.1 mg) oral tablet: 1 tab(s) orally once a day   albuterol 2.5 mg/3 mL (0.083%) inhalation solution: 3 milliliter(s) inhaled every 6 hours, As Needed  ALPRAZolam 0.5 mg oral tablet: 1 tab(s) orally once a day (at bedtime)  Fosamax 70 mg oral tablet: 1 tab(s) orally once a week - Saturdays  guaiFENesin 600 mg oral tablet, extended release: 1 tab(s) orally every 12 hours  losartan 25 mg oral tablet: 1 tab(s) orally once a day  montelukast 10 mg oral tablet: 1 tab(s) orally once a day  omeprazole 40 mg oral delayed release capsule: 1 cap(s) orally once a day  predniSONE 20 mg oral tablet: 2 tab(s) orally once a day  Spiriva 18 mcg inhalation capsule: 1 cap(s) inhaled once a day  Symbicort 80 mcg-4.5 mcg/inh inhalation aerosol: 2 puff(s) inhaled once a day  Synthroid 100 mcg (0.1 mg) oral tablet: 1 tab(s) orally once a day

## 2023-10-13 NOTE — PROGRESS NOTE ADULT - ASSESSMENT
Colitis/Diarrhea of unclear etiology    GI PCR negative, Cdiff panel negative    continue IVF  monitor electrolytes  monitor BMs  diet as tolerated  will need GI follow up on discharge  will follow    Discussed with Dr. Rae

## 2023-10-13 NOTE — DISCHARGE NOTE PROVIDER - CARE PROVIDER_API CALL
Mehul Jimenez  Gastroenterology  [address] [place] [address]  Phone: [phone]  Fax: [phone]  Follow Up Time:

## 2023-10-13 NOTE — DISCHARGE NOTE NURSING/CASE MANAGEMENT/SOCIAL WORK - PATIENT PORTAL LINK FT
You can access the FollowMyHealth Patient Portal offered by [facility] by registering at the following website: [email or web address]. By joining [employer]’s FollowMyHealth portal, you will also be able to view your health information using other applications (apps) compatible with our system.

## 2023-10-13 NOTE — DISCHARGE NOTE PROVIDER - NSDCCPCAREPLAN_GEN_ALL_CORE_FT
PRINCIPAL DISCHARGE DIAGNOSIS  Diagnosis: Pancolitis  Assessment and Plan of Treatment: s/p abx resolved Work up negative   Likley Viral

## 2023-10-13 NOTE — DISCHARGE NOTE NURSING/CASE MANAGEMENT/SOCIAL WORK - NSDCFUADDAPPT_GEN_ALL_CORE_FT
You have been provided with discharge notice for today 10/13/2023 and are in agreement with DC plan.

## 2023-10-13 NOTE — DISCHARGE NOTE PROVIDER - HOSPITAL COURSE
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     Patient reports 1 week ago she got her infusion of Keytruda.  Patient reports the same time she did a colon cleanse.  Patient reports on Tuesday she started Ceftin for a cough that she has had for months.  Patient reports that today she started having diarrhea with nausea without vomiting.  Patient reports she was seen at urgent care on Saturday 10 7 who advised her to do a culture for C. difficile.  Patient reports at that time she started having nausea and 1 episode of vomiting last night.  Patient consulted her GI today Dr. Zapata who referred her to ED for evaluation.  Patient reports diffuse abdominal pain.  Patient took Tylenol yesterday which provided minimal relief. pt also notes sob that she has had since august. pt reports she currently took albuterol and ceftin for symptoms.      fever, chest pain, dysuria (10 Oct 2023 17:33)    GI pcr Negative   C diff Negative   CT abdomen shows Pancolitis     GI consulted   Advanced diet as tolerated   Follow up with pmd

## 2023-10-15 LAB
CULTURE RESULTS: SIGNIFICANT CHANGE UP
CULTURE RESULTS: SIGNIFICANT CHANGE UP
SPECIMEN SOURCE: SIGNIFICANT CHANGE UP
SPECIMEN SOURCE: SIGNIFICANT CHANGE UP

## 2023-11-13 PROCEDURE — 83605 ASSAY OF LACTIC ACID: CPT

## 2023-11-13 PROCEDURE — 85610 PROTHROMBIN TIME: CPT

## 2023-11-13 PROCEDURE — 86803 HEPATITIS C AB TEST: CPT

## 2023-11-13 PROCEDURE — 36415 COLL VENOUS BLD VENIPUNCTURE: CPT

## 2023-11-13 PROCEDURE — 87637 SARSCOV2&INF A&B&RSV AMP PRB: CPT

## 2023-11-13 PROCEDURE — 71045 X-RAY EXAM CHEST 1 VIEW: CPT

## 2023-11-13 PROCEDURE — 93005 ELECTROCARDIOGRAM TRACING: CPT

## 2023-11-13 PROCEDURE — 96375 TX/PRO/DX INJ NEW DRUG ADDON: CPT

## 2023-11-13 PROCEDURE — 80053 COMPREHEN METABOLIC PANEL: CPT

## 2023-11-13 PROCEDURE — 83880 ASSAY OF NATRIURETIC PEPTIDE: CPT

## 2023-11-13 PROCEDURE — 84484 ASSAY OF TROPONIN QUANT: CPT

## 2023-11-13 PROCEDURE — 74177 CT ABD & PELVIS W/CONTRAST: CPT | Mod: MA

## 2023-11-13 PROCEDURE — 85730 THROMBOPLASTIN TIME PARTIAL: CPT

## 2023-11-13 PROCEDURE — 85025 COMPLETE CBC W/AUTO DIFF WBC: CPT

## 2023-11-13 PROCEDURE — 71046 X-RAY EXAM CHEST 2 VIEWS: CPT

## 2023-11-13 PROCEDURE — 81001 URINALYSIS AUTO W/SCOPE: CPT

## 2023-11-13 PROCEDURE — 87493 C DIFF AMPLIFIED PROBE: CPT

## 2023-11-13 PROCEDURE — 93306 TTE W/DOPPLER COMPLETE: CPT

## 2023-11-13 PROCEDURE — 87040 BLOOD CULTURE FOR BACTERIA: CPT

## 2023-11-13 PROCEDURE — 83690 ASSAY OF LIPASE: CPT

## 2023-11-13 PROCEDURE — 71275 CT ANGIOGRAPHY CHEST: CPT | Mod: MA

## 2023-11-13 PROCEDURE — 96374 THER/PROPH/DIAG INJ IV PUSH: CPT

## 2023-11-13 PROCEDURE — 87507 IADNA-DNA/RNA PROBE TQ 12-25: CPT

## 2023-11-13 PROCEDURE — 99285 EMERGENCY DEPT VISIT HI MDM: CPT

## 2023-11-13 PROCEDURE — 94664 DEMO&/EVAL PT USE INHALER: CPT

## 2023-11-13 PROCEDURE — 80048 BASIC METABOLIC PNL TOTAL CA: CPT

## 2023-11-13 PROCEDURE — 94640 AIRWAY INHALATION TREATMENT: CPT

## 2023-11-14 ENCOUNTER — APPOINTMENT (OUTPATIENT)
Dept: SURGERY | Facility: CLINIC | Age: 73
End: 2023-11-14
Payer: MEDICARE

## 2023-11-14 ENCOUNTER — NON-APPOINTMENT (OUTPATIENT)
Age: 73
End: 2023-11-14

## 2023-11-14 VITALS
BODY MASS INDEX: 22.19 KG/M2 | DIASTOLIC BLOOD PRESSURE: 72 MMHG | WEIGHT: 113 LBS | HEIGHT: 60 IN | OXYGEN SATURATION: 98 % | HEART RATE: 104 BPM | SYSTOLIC BLOOD PRESSURE: 116 MMHG | TEMPERATURE: 97 F

## 2023-11-14 DIAGNOSIS — K21.9 GASTRO-ESOPHAGEAL REFLUX DISEASE W/OUT ESOPHAGITIS: ICD-10-CM

## 2023-11-14 DIAGNOSIS — K44.9 DIAPHRAGMATIC HERNIA W/OUT OBSTRUCTION OR GANGRENE: ICD-10-CM

## 2023-11-14 PROCEDURE — 99205 OFFICE O/P NEW HI 60 MIN: CPT

## 2023-11-14 RX ORDER — ATORVASTATIN CALCIUM 40 MG/1
40 TABLET, FILM COATED ORAL
Refills: 0 | Status: ACTIVE | COMMUNITY

## 2023-11-14 RX ORDER — BUDESONIDE AND FORMOTEROL FUMARATE DIHYDRATE 160; 4.5 UG/1; UG/1
160-4.5 AEROSOL RESPIRATORY (INHALATION) TWICE DAILY
Refills: 0 | Status: DISCONTINUED | COMMUNITY
End: 2023-11-14

## 2023-11-14 RX ORDER — FEXOFENADINE HYDROCHLORIDE 180 MG/1
TABLET ORAL
Refills: 0 | Status: ACTIVE | COMMUNITY

## 2023-11-14 RX ORDER — LEVOTHYROXINE SODIUM 0.1 MG/1
100 TABLET ORAL
Refills: 0 | Status: ACTIVE | COMMUNITY

## 2023-11-14 RX ORDER — TIOTROPIUM BROMIDE 18 UG/1
18 CAPSULE ORAL; RESPIRATORY (INHALATION) DAILY
Refills: 0 | Status: DISCONTINUED | COMMUNITY
End: 2023-11-14

## 2023-11-14 RX ORDER — FAMOTIDINE 20 MG/1
20 TABLET, FILM COATED ORAL
Refills: 0 | Status: ACTIVE | COMMUNITY

## 2023-12-08 ENCOUNTER — APPOINTMENT (OUTPATIENT)
Dept: OTOLARYNGOLOGY | Facility: CLINIC | Age: 73
End: 2023-12-08
Payer: MEDICARE

## 2023-12-08 VITALS
WEIGHT: 98 LBS | HEIGHT: 60 IN | DIASTOLIC BLOOD PRESSURE: 76 MMHG | SYSTOLIC BLOOD PRESSURE: 111 MMHG | BODY MASS INDEX: 19.24 KG/M2 | HEART RATE: 114 BPM

## 2023-12-08 DIAGNOSIS — K52.9 NONINFECTIVE GASTROENTERITIS AND COLITIS, UNSPECIFIED: ICD-10-CM

## 2023-12-08 PROCEDURE — 31575 DIAGNOSTIC LARYNGOSCOPY: CPT

## 2023-12-08 PROCEDURE — 99204 OFFICE O/P NEW MOD 45 MIN: CPT | Mod: 25

## 2023-12-15 ENCOUNTER — NON-APPOINTMENT (OUTPATIENT)
Age: 73
End: 2023-12-15

## 2023-12-18 ENCOUNTER — APPOINTMENT (OUTPATIENT)
Dept: OTOLARYNGOLOGY | Facility: CLINIC | Age: 73
End: 2023-12-18

## 2023-12-19 ENCOUNTER — APPOINTMENT (OUTPATIENT)
Dept: CARDIOLOGY | Facility: CLINIC | Age: 73
End: 2023-12-19
Payer: MEDICARE

## 2023-12-19 ENCOUNTER — NON-APPOINTMENT (OUTPATIENT)
Age: 73
End: 2023-12-19

## 2023-12-19 VITALS
HEART RATE: 120 BPM | OXYGEN SATURATION: 97 % | DIASTOLIC BLOOD PRESSURE: 60 MMHG | SYSTOLIC BLOOD PRESSURE: 96 MMHG | HEIGHT: 60 IN

## 2023-12-19 VITALS — DIASTOLIC BLOOD PRESSURE: 60 MMHG | SYSTOLIC BLOOD PRESSURE: 90 MMHG

## 2023-12-19 DIAGNOSIS — Z87.898 PERSONAL HISTORY OF OTHER SPECIFIED CONDITIONS: ICD-10-CM

## 2023-12-19 PROCEDURE — 99214 OFFICE O/P EST MOD 30 MIN: CPT

## 2023-12-19 PROCEDURE — 93000 ELECTROCARDIOGRAM COMPLETE: CPT

## 2023-12-19 RX ORDER — LOSARTAN POTASSIUM 25 MG/1
25 TABLET, FILM COATED ORAL
Qty: 90 | Refills: 1 | Status: DISCONTINUED | COMMUNITY
Start: 2017-08-30 | End: 2023-12-19

## 2023-12-19 NOTE — DISCUSSION/SUMMARY
[FreeTextEntry1] :  Tachycardia: Heart rate is higher than it had been in the past--predominant etiology unclear but probably multifactorial related to malignancy.  She has had a chronic, small pericardial effusion but an echocardiogram in [facility] several months ago describes it as "moderate" in size--I recommend repeat imaging to assess for interval change in effusion size that could explain tachycardia and hypotension.  Hypertension: History of hypertension on losartan but blood pressure is low today; asymptomatic; echocardiogram ordered; stop losartan.  I encouraged hydration and nutrition.

## 2023-12-19 NOTE — REVIEW OF SYSTEMS
[Weight Loss (___ Lbs)] : [unfilled] ~Ulb weight loss [Chest Discomfort] : no chest discomfort [Palpitations] : no palpitations [FreeTextEntry4] : Hoarseness due to vocal cord problem [FreeTextEntry5] : Elevated heart rate

## 2023-12-19 NOTE — PHYSICAL EXAM
[Normal S1, S2] : normal S1, S2 [Clear Lung Fields] : clear lung fields [Normal Gait] : normal gait [No Edema] : no edema [de-identified] : Low BMI [de-identified] : Extraocular muscles are intact [de-identified] : Normocephalic, Hoarse [de-identified] : Mild tachycardia

## 2023-12-19 NOTE — HISTORY OF PRESENT ILLNESS
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  She missed an appointment to see me on 8/24/22.  She returns for cardiology visit because of tachycardia--says that her heart rate is always "fast" when she sees her numerous physicians; no significant palpitations.  She describes a difficult several years with chemotherapy and side effects of treatment,

## 2023-12-19 NOTE — CARDIOLOGY SUMMARY
[de-identified] : 12/19/2023.  Sinus Tachycardia with occasional premature atrial complexes.  Low voltage. [de-identified] : 7/8/19, Completed 7 minutes 30 seconds of the Mo protocol achieving 9 METS. No ischemic ECG changes. No chest pain with exercise. No exercise induced arrhythmias.   [de-identified] : 12/28/20, Normal left ventricular size and systolic function. Mild diastolic dysfunction. Mild concentric left ventricular hypertrophy. Normal right ventricular size and function. Small to medium-sized pericardial effusion. LVEF 62%.

## 2023-12-21 ENCOUNTER — APPOINTMENT (OUTPATIENT)
Dept: OTOLARYNGOLOGY | Facility: CLINIC | Age: 73
End: 2023-12-21
Payer: MEDICARE

## 2023-12-21 PROCEDURE — 92524 BEHAVRAL QUALIT ANALYS VOICE: CPT | Mod: GN

## 2023-12-29 ENCOUNTER — APPOINTMENT (OUTPATIENT)
Dept: OTOLARYNGOLOGY | Facility: CLINIC | Age: 73
End: 2023-12-29
Payer: MEDICARE

## 2023-12-29 PROCEDURE — 92507 TX SP LANG VOICE COMM INDIV: CPT | Mod: GN

## 2024-01-05 ENCOUNTER — APPOINTMENT (OUTPATIENT)
Dept: OTOLARYNGOLOGY | Facility: CLINIC | Age: 74
End: 2024-01-05

## 2024-01-08 ENCOUNTER — NON-APPOINTMENT (OUTPATIENT)
Age: 74
End: 2024-01-08

## 2024-01-12 ENCOUNTER — APPOINTMENT (OUTPATIENT)
Dept: CARDIOLOGY | Facility: CLINIC | Age: 74
End: 2024-01-12
Payer: MEDICARE

## 2024-01-12 ENCOUNTER — OUTPATIENT (OUTPATIENT)
Dept: OUTPATIENT SERVICES | Facility: HOSPITAL | Age: 74
LOS: 1 days | Discharge: HOME | End: 2024-01-12

## 2024-01-12 ENCOUNTER — APPOINTMENT (OUTPATIENT)
Dept: OTOLARYNGOLOGY | Facility: CLINIC | Age: 74
End: 2024-01-12
Payer: MEDICARE

## 2024-01-12 DIAGNOSIS — Z98.890 OTHER SPECIFIED POSTPROCEDURAL STATES: Chronic | ICD-10-CM

## 2024-01-12 DIAGNOSIS — C32.9 MALIGNANT NEOPLASM OF LARYNX, UNSPECIFIED: ICD-10-CM

## 2024-01-12 DIAGNOSIS — Z98.49 CATARACT EXTRACTION STATUS, UNSPECIFIED EYE: Chronic | ICD-10-CM

## 2024-01-12 DIAGNOSIS — Z87.81 PERSONAL HISTORY OF (HEALED) TRAUMATIC FRACTURE: Chronic | ICD-10-CM

## 2024-01-12 PROCEDURE — 93306 TTE W/DOPPLER COMPLETE: CPT

## 2024-01-12 PROCEDURE — 92507 TX SP LANG VOICE COMM INDIV: CPT | Mod: GN

## 2024-01-18 ENCOUNTER — NON-APPOINTMENT (OUTPATIENT)
Age: 74
End: 2024-01-18

## 2024-01-18 ENCOUNTER — APPOINTMENT (OUTPATIENT)
Dept: HEMATOLOGY ONCOLOGY | Facility: CLINIC | Age: 74
End: 2024-01-18
Payer: MEDICARE

## 2024-01-18 VITALS
TEMPERATURE: 98.4 F | BODY MASS INDEX: 19.82 KG/M2 | OXYGEN SATURATION: 97 % | HEIGHT: 58.5 IN | RESPIRATION RATE: 16 BRPM | WEIGHT: 97 LBS | DIASTOLIC BLOOD PRESSURE: 77 MMHG | SYSTOLIC BLOOD PRESSURE: 134 MMHG | HEART RATE: 101 BPM

## 2024-01-18 DIAGNOSIS — C78.00 SECONDARY MALIGNANT NEOPLASM OF UNSPECIFIED LUNG: ICD-10-CM

## 2024-01-18 DIAGNOSIS — Z87.891 PERSONAL HISTORY OF NICOTINE DEPENDENCE: ICD-10-CM

## 2024-01-18 PROCEDURE — 99205 OFFICE O/P NEW HI 60 MIN: CPT

## 2024-01-18 NOTE — HISTORY OF PRESENT ILLNESS
[Disease: _____________________] : Disease: [unfilled] [M: ___] : M[unfilled] [AJCC Stage: ____] : AJCC Stage: [unfilled] [de-identified] : Ms. Curran is a pleasant 72 yo woman with pmhx  Samter's triad, hypertension, HPV related throat cancer, treated initially with CRT to tonsil (cisplatin/carboplatin) in JAn/Feb of 2020. In 2021, she was noted to have metastases to the lung- surgery in Dec 2021. Next year, diagnosed with LAD that was treated with proton therapy for 6 weeks, with residual disease. She was started on carbo/5-Fu and pembro from May to September 2023 and had been on Keytruda maintenance last dose on10-23, complicated by severe colitis. She is being treated vidolizumab and there are plans of changing to inflixumab since her serological markers were elevated (calprotectin).  Of note, pt has hx of pericardial effusion, and arthritis of unknown etiology.  As per pt, diarrhea has significantly improved over the last week. She was admitted recently in October 2023 to [facility] with abd pain when colitis dx was made.   Pt is currently under the care of Dr. Tovar at [facility]. Her PET/CT from earlier today showed VICTORIA.  Pt has no symptoms other than persistent dysphonia, for which she is receiving speech therapy with improvement.  PD-L1 90%         Med Reconciliation:  Medication Reconciliation Status Admission Reconciliation is Not Complete  Discharge Reconciliation is Completed  Discharge Medications albuterol 2.5 mg/3 mL (0.083%) inhalation solution: 3  milliliter(s) inhaled every 6 hours, As Needed  ALPRAZolam 0.5 mg oral tablet: 1 tab(s) orally once a day (at bedtime)  Fosamax 70 mg oral tablet: 1 tab(s) orally once a week - Saturdays  guaiFENesin 600 mg oral tablet, extended release: 1 tab(s) orally every 12  hours  losartan 25 mg oral tablet: 1 tab(s) orally once a day  montelukast 10 mg oral tablet: 1 tab(s) orally once a day  omeprazole 40 mg oral delayed release capsule: 1 cap(s) orally once a day  predniSONE 20 mg oral tablet: 2 tab(s) orally once a day  Spiriva 18 mcg inhalation capsule: 1 cap(s) inhaled once a day  Symbicort 80 mcg-4.5 mcg/inh inhalation aerosol: 2 puff(s) inhaled once a day  Synthroid 100 mcg (0.1 mg) oral tablet: 1 tab(s) orally once a day    ,  ,  Care Plan/Procedures:  Discharge Diagnoses, Assessment and Plan of Treatment PRINCIPAL DISCHARGE  DIAGNOSIS  Diagnosis: Pancolitis  Assessment and Plan of Treatment: s/p abx resolved Work up negative  Likley Viral  Goal(s) To get better and follow your care plan as instructed.  Follow Up:  Care Providers for Follow up (PCP/Outpatient Provider) Mehul Jimenez  Gastroenterology  [address] [place] [id number]  Phone: [phone]  Fax: [phone]  Follow Up Time:  Additional Provider Info (For SysAdmin Use Only)   PROVIDER:[TOKEN:[[id number]]]  Care Providers Direct Addresses (For SYSAdmin Use Only)   ,[email or web address]  NPI number (For SysAdmin Use Only) : [[id number]]  Discharge Diet Regular Diet - No restrictions, Low Sodium Diet  Activity No restrictions  Quality Measures:  Hospice Patient No  Core Measure Site No  Does the patient have a principal diagnosis of ischemic stroke, hemorrhagic  stroke, or TIA? No  Does the patient have a principal diagnosis of Acute Myocardial Infarction?              No  Has the patient had a Percutaneous Coronary Intervention? No  Did the Patient Present With or was Treated for Malnutrition During This  Admission Yes...  Details of Malnutrition Diagnosis/Diagnoses This patient has been assessed with  a concern for Malnutrition and was treated during this hospitalization for the  following Nutrition diagnosis/diagnoses:     -  10/12/2023: Severe protein-calorie malnutrition   -  10/12/2023: Underweight (BMI < 19)  Document Complete:  Care Provider Seen in [facility]  Physician Section Complete This document is complete and the patient is ready  for discharge.  For questions about your prescriptions, please call: [phone]  Is this contact telephone number correct? Yes  Attending Attestation Statement I have personally seen and examined the  patient. I have collaborated with and supervised the  . on the discharge service for the patient. I have reviewed and made amendments  to the documentation where necessary.   [de-identified] : sq cell ca

## 2024-01-18 NOTE — CONSULT LETTER
[Dear  ___] : Dear  [unfilled], [Courtesy Letter:] : I had the pleasure of seeing your patient, [unfilled], in my office today. [Please see my note below.] : Please see my note below. [Sincerely,] : Sincerely, [FreeTextEntry2] : Dr. Wilner Tovar [FreeTextEntry3] : León Briceno MD

## 2024-01-18 NOTE — PHYSICAL EXAM
[Fully active, able to carry on all pre-disease performance without restriction] : Status 0 - Fully active, able to carry on all pre-disease performance without restriction [Normal] : affect appropriate [de-identified] : raspy voice

## 2024-01-18 NOTE — ASSESSMENT
[FreeTextEntry1] : Ms. Curran is a 72 yo w with metastatic head and neck cancer, HPV pos recently treated with chemo+ pembro, last tx in October 2023 complicated by colitis.  Currently on active tx for colitis Not on any cancer tx PET/CT from today VICTORIA I discussed with pt that I concur with my colleague, Dr Tovar continuing pembro at this time is detrimental. Since she does not have any disease, she does not need active cancer management at this time we also discussed absolute diets, supplements, which I discouraged. We discussed ctDNA monitoring using Navdex or Andrew platforms. These are complemental and do not replace CT scans.  Also, suggest MRI once a year since I have had anecdotes in the past where pts have recurred in the CNS.  Extensive discussion and emotional support was provided.  Encouraged participation in support groups.

## 2024-01-18 NOTE — REVIEW OF SYSTEMS
[Diarrhea: Grade 2 - Increase of 4 - 6 stools per day over baseline; moderate increase in ostomy output compared to baseline] : Diarrhea: Grade 2 - Increase of 4 - 6 stools per day over baseline; moderate increase in ostomy output compared to baseline [Negative] : Allergic/Immunologic

## 2024-01-19 ENCOUNTER — APPOINTMENT (OUTPATIENT)
Dept: OTOLARYNGOLOGY | Facility: CLINIC | Age: 74
End: 2024-01-19

## 2024-01-25 ENCOUNTER — APPOINTMENT (OUTPATIENT)
Dept: OTOLARYNGOLOGY | Facility: CLINIC | Age: 74
End: 2024-01-25
Payer: MEDICARE

## 2024-01-25 PROCEDURE — 92507 TX SP LANG VOICE COMM INDIV: CPT | Mod: GN

## 2024-02-09 ENCOUNTER — APPOINTMENT (OUTPATIENT)
Dept: OTOLARYNGOLOGY | Facility: CLINIC | Age: 74
End: 2024-02-09
Payer: MEDICARE

## 2024-02-09 PROCEDURE — 92507 TX SP LANG VOICE COMM INDIV: CPT | Mod: GN

## 2024-02-14 ENCOUNTER — APPOINTMENT (OUTPATIENT)
Dept: OTOLARYNGOLOGY | Facility: CLINIC | Age: 74
End: 2024-02-14
Payer: MEDICARE

## 2024-02-14 VITALS
SYSTOLIC BLOOD PRESSURE: 144 MMHG | HEART RATE: 103 BPM | DIASTOLIC BLOOD PRESSURE: 83 MMHG | HEIGHT: 58 IN | BODY MASS INDEX: 20.57 KG/M2 | WEIGHT: 98 LBS

## 2024-02-14 DIAGNOSIS — R59.0 LOCALIZED ENLARGED LYMPH NODES: ICD-10-CM

## 2024-02-14 DIAGNOSIS — R49.9 UNSPECIFIED VOICE AND RESONANCE DISORDER: ICD-10-CM

## 2024-02-14 PROCEDURE — 31575 DIAGNOSTIC LARYNGOSCOPY: CPT

## 2024-02-14 PROCEDURE — 99214 OFFICE O/P EST MOD 30 MIN: CPT | Mod: 25

## 2024-02-14 NOTE — ASSESSMENT
[FreeTextEntry1] :  Reviewed and reconciled medications, allergies, PMHx, PSHx, SocHx, FMHx.  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 Pt states that her voice has improved. Pt states that she congestion that she has trouble coughing up. Pt states that sometimes she will regurgitate. Pt states that she had a yellow discharge coming from her left nostril. Pt states that she has been seeing a speech pathologist every 1-2 weeks. Pt states that she uses a nebulizer with Albuterol 3 times a day.   Videostrobe: -more motion of VC bilaterally compared to 2/14/24 Flexible laryngoscopy  Physical exam: -wide open and clear nasally -PND L>R -dry mouth  ENT Procedure: Flexible laryngoscopy -right VC paralysis -left VC moving, but is stiff -larynx looks frozen -left side is not abducting well -right side not moving at all  Plan: -discussed r/b/a of vocal cord injection -Continue Bactrim for 10 days (drink plenty of water with use) -Continue speech therapy -ordered Videostrobe -FU with results from Videostrobe No

## 2024-02-14 NOTE — ADDENDUM
[FreeTextEntry1] :  Documented by Stu Kate acting as scribe for Dr. Asif on 02/14/2024. All Medical record entries made by the Scribe were at my, Dr. Asif, direction and personally dictated by me on 02/14/2024 . I have reviewed the chart and agree that the record accurately reflects my personal performance of the history, physical exam, assessment and plan. I have also personally directed, reviewed, and agreed with the discharge instructions.

## 2024-02-14 NOTE — HISTORY OF PRESENT ILLNESS
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 Pt states that her voice has improved. Pt states that she congestion that she has trouble coughing up. Pt states that sometimes she will regurgitate. Pt states that she had a yellow discharge coming from her left nostril. Pt states that she has been seeing a speech pathologist every 1-2 weeks. Pt states that she uses a nebulizer with Albuterol 3 times a day.

## 2024-02-14 NOTE — CONSULT LETTER
[Dear  ___] : Dear  [unfilled], [Courtesy Letter:] : I had the pleasure of seeing your patient, [unfilled], in my office today. [Please see my note below.] : Please see my note below. [Consult Closing:] : Thank you very much for allowing me to participate in the care of this patient.  If you have any questions, please do not hesitate to contact me. [Sincerely,] : Sincerely, [FreeTextEntry3] :  Juilo Asif MD FACS

## 2024-02-14 NOTE — PROCEDURE
[Hoarseness] : hoarseness not clearly evaluated by indirect laryngoscopy [Complicated Symptoms] : complicated symptoms requiring more thorough examination than provided by mirror [de-identified] :  Procedure: Flexible Fiberoptic Laryngoscopy: Risks, benefits, and alternatives of flexible endoscopy were explained to the patient. The patient gave oral consent to proceed. The flexible scope was inserted into the right nasal cavity and advanced towards the nasopharynx. Visualized mucosa over the turbinates and septum were as described above. The nasopharynx was clear. Oropharyngeal walls were symmetric and mobile without lesion, mass, or edema. Hypopharynx was also without lesion or edema. Larynx was mobile without lesions. Supraglottic structures were free of edema, mass, and asymmetry. True vocal folds were white without mass or lesion. Base of tongue was within normal limits. -right VC paralysis -left VC moving, but is stiff -larynx looks frozen -left side is not abducting well -right side not moving at all

## 2024-02-14 NOTE — PHYSICAL EXAM
[Midline] : trachea located in midline position [Normal] : orientation to person, place, and time: normal [Hearing Loss Right Only] : normal [Hearing Loss Left Only] : normal [de-identified] : PND L>R. dry mouth [FreeTextEntry2] :  sinuses nontender to percussion.  sensations intact

## 2024-03-06 ENCOUNTER — APPOINTMENT (OUTPATIENT)
Dept: OTOLARYNGOLOGY | Facility: CLINIC | Age: 74
End: 2024-03-06
Payer: MEDICARE

## 2024-03-06 ENCOUNTER — TRANSCRIPTION ENCOUNTER (OUTPATIENT)
Age: 74
End: 2024-03-06

## 2024-03-06 PROCEDURE — 31579 LARYNGOSCOPY TELESCOPIC: CPT

## 2024-03-06 PROCEDURE — 92524 BEHAVRAL QUALIT ANALYS VOICE: CPT | Mod: GN

## 2024-03-06 RX ORDER — SULFAMETHOXAZOLE AND TRIMETHOPRIM 400; 80 MG/1; MG/1
400-80 TABLET ORAL DAILY
Qty: 10 | Refills: 0 | Status: ACTIVE | COMMUNITY
Start: 2024-03-06 | End: 1900-01-01

## 2024-03-26 ENCOUNTER — APPOINTMENT (OUTPATIENT)
Dept: CARDIOLOGY | Facility: CLINIC | Age: 74
End: 2024-03-26

## 2024-04-01 ENCOUNTER — APPOINTMENT (OUTPATIENT)
Dept: OTOLARYNGOLOGY | Facility: CLINIC | Age: 74
End: 2024-04-01
Payer: MEDICARE

## 2024-04-01 VITALS
DIASTOLIC BLOOD PRESSURE: 86 MMHG | HEIGHT: 60 IN | SYSTOLIC BLOOD PRESSURE: 135 MMHG | BODY MASS INDEX: 19.63 KG/M2 | WEIGHT: 100 LBS | HEART RATE: 101 BPM

## 2024-04-01 DIAGNOSIS — R49.0 DYSPHONIA: ICD-10-CM

## 2024-04-01 DIAGNOSIS — J38.01 PARALYSIS OF VOCAL CORDS AND LARYNX, UNILATERAL: ICD-10-CM

## 2024-04-01 DIAGNOSIS — R05.9 COUGH, UNSPECIFIED: ICD-10-CM

## 2024-04-01 DIAGNOSIS — C09.9 MALIGNANT NEOPLASM OF TONSIL, UNSPECIFIED: ICD-10-CM

## 2024-04-01 PROCEDURE — 99213 OFFICE O/P EST LOW 20 MIN: CPT

## 2024-04-01 NOTE — CONSULT LETTER
[Dear  ___] : Dear  [unfilled], [Courtesy Letter:] : I had the pleasure of seeing your patient, [unfilled], in my office today. [Consult Closing:] : Thank you very much for allowing me to participate in the care of this patient.  If you have any questions, please do not hesitate to contact me. [Please see my note below.] : Please see my note below. [Sincerely,] : Sincerely, [FreeTextEntry3] :  Julio Asif MD FACS

## 2024-04-01 NOTE — ASSESSMENT
[FreeTextEntry1] :  Reviewed and reconciled medications, allergies, PMHx, PSHx, SocHx, FMHx.   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 She states that she still has congestion that she has trouble coughing up.   CT scan October 2023: -pleural effusions -right upper lobe of lung removed -right middle lobe of lung collapsed  PET scan: -neck no uptake -lungs have posterior radiation changes- pleural effusion on the right side -new left lung FDG pickup; right side improved -evidence of colitis  Videostrobe 3/6/24: -right vocal cords paralysis -no growths on the vocal cords -reduced motion of the left vocal cord -adequate compensation of the left vocal cord -slight posterior gap -slight hyperfunction -recommendation is speech therapy  Plan: -Continue speech therapy -Discussed r/b/a of right vocal cord injection and partial removal of left vocal cord. Hold off for now -Consider using Lung Clear Pro -Greater than 50% of interaction spent discussing results and treatment -FU as needed with speech therapist

## 2024-04-01 NOTE — ADDENDUM
[FreeTextEntry1] :  Documented by Stu Kate acting as scribe for Dr. Asif on 04/01/2024. All Medical record entries made by the Scribe were at my, Dr. Asif, direction and personally dictated by me on 04/01/2024 . I have reviewed the chart and agree that the record accurately reflects my personal performance of the history, physical exam, assessment and plan. I have also personally directed, reviewed, and agreed with the discharge instructions.

## 2024-04-01 NOTE — HISTORY OF PRESENT ILLNESS
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 She states that she still has congestion that she has trouble coughing up.

## 2024-04-03 ENCOUNTER — NON-APPOINTMENT (OUTPATIENT)
Age: 74
End: 2024-04-03

## 2024-04-04 ENCOUNTER — NON-APPOINTMENT (OUTPATIENT)
Age: 74
End: 2024-04-04

## 2024-04-09 ENCOUNTER — NON-APPOINTMENT (OUTPATIENT)
Age: 74
End: 2024-04-09

## 2024-04-09 ENCOUNTER — APPOINTMENT (OUTPATIENT)
Dept: CARDIOLOGY | Facility: CLINIC | Age: 74
End: 2024-04-09
Payer: MEDICARE

## 2024-04-09 VITALS
DIASTOLIC BLOOD PRESSURE: 62 MMHG | SYSTOLIC BLOOD PRESSURE: 102 MMHG | HEART RATE: 116 BPM | OXYGEN SATURATION: 96 % | BODY MASS INDEX: 19.53 KG/M2 | WEIGHT: 100 LBS

## 2024-04-09 DIAGNOSIS — I10 ESSENTIAL (PRIMARY) HYPERTENSION: ICD-10-CM

## 2024-04-09 DIAGNOSIS — I31.39 OTHER PERICARDIAL EFFUSION (NONINFLAMMATORY): ICD-10-CM

## 2024-04-09 DIAGNOSIS — R00.0 TACHYCARDIA, UNSPECIFIED: ICD-10-CM

## 2024-04-09 PROCEDURE — G2211 COMPLEX E/M VISIT ADD ON: CPT

## 2024-04-09 PROCEDURE — 93000 ELECTROCARDIOGRAM COMPLETE: CPT

## 2024-04-09 PROCEDURE — 99214 OFFICE O/P EST MOD 30 MIN: CPT

## 2024-04-09 RX ORDER — ALPRAZOLAM 0.25 MG/1
0.25 TABLET ORAL
Refills: 0 | Status: ACTIVE | COMMUNITY

## 2024-04-09 RX ORDER — CHLORHEXIDINE GLUCONATE 4 %
325 (65 FE) LIQUID (ML) TOPICAL
Refills: 0 | Status: ACTIVE | COMMUNITY

## 2024-04-09 RX ORDER — PREDNISONE 20 MG/1
20 TABLET ORAL
Refills: 0 | Status: ACTIVE | COMMUNITY

## 2024-04-09 NOTE — REVIEW OF SYSTEMS
[Chest Discomfort] : no chest discomfort [Palpitations] : no palpitations [Under Stress] : under stress [FreeTextEntry2] : Stable weight [FreeTextEntry4] : Hoarseness [FreeTextEntry5] : Elevated heart rate Irregular Contractions

## 2024-04-09 NOTE — PHYSICAL EXAM
[Normal S1, S2] : normal S1, S2 [Clear Lung Fields] : clear lung fields [Normal Gait] : normal gait [No Edema] : no edema [No Murmur] : no murmur [Alert and Oriented] : alert and oriented [de-identified] : Appears well [de-identified] : Mild hoarseness [de-identified] : Mild tachycardia

## 2024-04-09 NOTE — DISCUSSION/SUMMARY
[With Me] : with me [___ Month(s)] : in [unfilled] month(s) [FreeTextEntry1] :  Tachycardia: Mild, persistent sinus tachycardia with occasional premature atrial beats.  Given the absence of arrhythmia and tolerance, will not prescribe Rx to lower rate at this time.  Pericardial effusion: Small to medium in size and stable.  We discussed need for continued monitoring.  Hypertension: Home blood pressure log reviewed and discussed with patient; continue losartan 50 mg daily--blood pressure somewhat labile but overall control is satisfactory.

## 2024-04-09 NOTE — HISTORY OF PRESENT ILLNESS
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  Her blood pressure was low when I last saw her in December 2023, and I recommended that she stop losartan -- BP subsequently misael, and she resumed therapy.  She has been monitoring BP at home -- some lability but reasonably well-controlled (no hypotensive readings). She is currently taking prednisone for exacerbation of asthma.  She has no specific cardiac complaints.  She is looking forward to a July trip to [place].  * This visit was conducted as part of ongoing, longitudinal medical care for patient's chronic medical diagnoses and other issues.

## 2024-04-09 NOTE — CARDIOLOGY SUMMARY
[de-identified] : 4/9/24. Sinus Tachycardia with frequent PACs.  Nonspecific T-abnormality. [de-identified] : 7/8/19, Completed 7 minutes 30 seconds of the Mo protocol achieving 9 METS. No ischemic ECG changes. No chest pain with exercise. No exercise induced arrhythmias.   [de-identified] : 1/12/2024. Left ventricular systolic function is normal with an ejection fraction visually estimated at 55 to 60 %. There is mild (grade 1) left ventricular diastolic dysfunction. Normal right ventricular cavity size and normal systolic function. Mild tricuspid regurgitation. Small-medium pericardial effusion.

## 2024-04-29 RX ORDER — LOSARTAN POTASSIUM 50 MG/1
50 TABLET, FILM COATED ORAL DAILY
Qty: 90 | Refills: 3 | Status: ACTIVE | COMMUNITY
Start: 1900-01-01 | End: 1900-01-01

## 2025-02-21 NOTE — PATIENT PROFILE ADULT - FALL HARM RISK - HARM RISK INTERVENTIONS
Female Communicate Risk of Fall with Harm to all staff/Reinforce activity limits and safety measures with patient and family/Tailored Fall Risk Interventions/Visual Cue: Yellow wristband and red socks/Bed in lowest position, wheels locked, appropriate side rails in place/Call bell, personal items and telephone in reach/Instruct patient to call for assistance before getting out of bed or chair/Non-slip footwear when patient is out of bed/[place] to call system/Physically safe environment - no spills, clutter or unnecessary equipment/Purposeful Proactive Rounding/Room/bathroom lighting operational, light cord in reach